# Patient Record
Sex: FEMALE | Race: WHITE | Employment: OTHER | URBAN - METROPOLITAN AREA
[De-identification: names, ages, dates, MRNs, and addresses within clinical notes are randomized per-mention and may not be internally consistent; named-entity substitution may affect disease eponyms.]

---

## 2017-03-20 ENCOUNTER — HOSPITAL ENCOUNTER (OUTPATIENT)
Dept: LAB | Age: 72
Discharge: HOME | End: 2017-03-20
Payer: MEDICARE

## 2017-03-20 ENCOUNTER — OFFICE VISIT (OUTPATIENT)
Dept: INTERNAL MEDICINE CLINIC | Age: 72
End: 2017-03-20

## 2017-03-20 VITALS
TEMPERATURE: 97.6 F | RESPIRATION RATE: 17 BRPM | DIASTOLIC BLOOD PRESSURE: 73 MMHG | HEART RATE: 85 BPM | HEIGHT: 62 IN | BODY MASS INDEX: 23.55 KG/M2 | SYSTOLIC BLOOD PRESSURE: 118 MMHG | OXYGEN SATURATION: 94 % | WEIGHT: 128 LBS

## 2017-03-20 DIAGNOSIS — Z11.59 NEED FOR HEPATITIS C SCREENING TEST: ICD-10-CM

## 2017-03-20 DIAGNOSIS — N81.4 UTERINE PROLAPS: ICD-10-CM

## 2017-03-20 DIAGNOSIS — R73.03 PREDIABETES: Primary | ICD-10-CM

## 2017-03-20 DIAGNOSIS — E55.9 VITAMIN D DEFICIENCY: ICD-10-CM

## 2017-03-20 DIAGNOSIS — E78.00 PURE HYPERCHOLESTEROLEMIA: ICD-10-CM

## 2017-03-20 DIAGNOSIS — C50.911 MALIGNANT NEOPLASM OF RIGHT FEMALE BREAST, UNSPECIFIED SITE OF BREAST: ICD-10-CM

## 2017-03-20 DIAGNOSIS — R73.01 IFG (IMPAIRED FASTING GLUCOSE): ICD-10-CM

## 2017-03-20 PROCEDURE — 85027 COMPLETE CBC AUTOMATED: CPT

## 2017-03-20 PROCEDURE — 80053 COMPREHEN METABOLIC PANEL: CPT

## 2017-03-20 PROCEDURE — 36415 COLL VENOUS BLD VENIPUNCTURE: CPT

## 2017-03-20 PROCEDURE — 82306 VITAMIN D 25 HYDROXY: CPT

## 2017-03-20 PROCEDURE — 83036 HEMOGLOBIN GLYCOSYLATED A1C: CPT

## 2017-03-20 PROCEDURE — 86803 HEPATITIS C AB TEST: CPT

## 2017-03-20 RX ORDER — ATORVASTATIN CALCIUM 20 MG/1
TABLET, FILM COATED ORAL
Qty: 90 TAB | Refills: 2 | Status: SHIPPED | OUTPATIENT
Start: 2017-03-20 | End: 2018-03-09 | Stop reason: SDUPTHER

## 2017-03-20 NOTE — MR AVS SNAPSHOT
Visit Information Date & Time Provider Department Dept. Phone Encounter #  
 3/20/2017  1:15 PM Rigo Hannon, [address] [phone] [id number] Follow-up Instructions Return in about 6 months (around 9/25/2017). Upcoming Health Maintenance Date Due Hepatitis C Screening 1945 Pneumococcal 65+ High/Highest Risk (2 of 2 - PCV13) 9/20/2017 MEDICARE YEARLY EXAM 9/21/2017 GLAUCOMA SCREENING Q2Y 12/9/2017 COLONOSCOPY 2/1/2018 BREAST CANCER SCRN MAMMOGRAM 4/12/2018 DTaP/Tdap/Td series (3 - Td) 2/3/2026 Allergies as of 3/20/2017  Review Complete On: 3/20/2017 By: Anais Burr LPN No Known Allergies Current Immunizations  Reviewed on 3/20/2017 Name Date Influenza Vaccine 11/11/2013 Influenza Vaccine (Quad) PF 9/20/2016 Pneumococcal Conjugate (PCV-13) 3/16/2017 Pneumococcal Polysaccharide (PPSV-23) 9/20/2016 Tdap 2/3/2016, 11/10/2008 Zoster Vaccine, Live 10/26/2012 Reviewed by Rigo Hannon MD on 3/20/2017 at  1:22 PM  
You Were Diagnosed With   
  
 Codes Comments Prediabetes    -  Primary ICD-10-CM: R73.03 
ICD-9-CM: 790.29 Pure hypercholesterolemia     ICD-10-CM: E78.00 ICD-9-CM: 272.0 Malignant neoplasm of right female breast, unspecified site [facility]     ICD-10-CM: C50.911 ICD-9-CM: 174.9 Uterine prolaps     ICD-10-CM: N81.4 ICD-9-CM: 618.1 Need for hepatitis C screening test     ICD-10-CM: Z11.59 
ICD-9-CM: V73.89 Vitamin D deficiency     ICD-10-CM: E55.9 ICD-9-CM: 268.9 IFG (impaired fasting glucose)     ICD-10-CM: R73.01 
ICD-9-CM: 790.21 Vitals BP Pulse Temp Resp Height(growth percentile) Weight(growth percentile) 118/73 (BP 1 Location: Left arm, BP Patient Position: Sitting) 85 97.6 °F (36.4 °C) (Oral) 17 5' 2\" (1.575 m) 128 lb (58.1 kg) SpO2 BMI OB Status Smoking Status 94% 23.41 kg/m2 Postmenopausal Former Smoker Vitals History BMI and BSA Data Body Mass Index Body Surface Area  
 23.41 kg/m 2 1.59 m 2 Preferred Pharmacy Pharmacy Name Phone [address], [facility] [phone] Your Updated Medication List  
  
   
This list is accurate as of: 3/20/17  1:22 PM.  Always use your most recent med list.  
  
  
  
  
 Acai Berry Extract 500 mg Cap Take 2 Caps by mouth daily. acetaminophen 325 mg tablet Commonly known as:  TYLENOL Take 325-650 mg by mouth every four (4) hours as needed for Pain. atorvastatin 20 mg tablet Commonly known as:  LIPITOR  
TAKE 1 TABLET NIGHTLY CALCIUM PO Take  by mouth. KRILL -87-33-50 mg Cap Generic drug:  krill-omega-3-dha-epa-lipids Take 1 Cap by mouth daily. loratadine 10 mg tablet Commonly known as:  Soliman Tatiana Take 10 mg by mouth.  
  
 multivitamin tablet Commonly known as:  ONE A DAY Take 1 Tab by mouth daily. PROBIOTIC 4X 10-15 mg Tbec Generic drug:  B.infantis-B.ani-B.long-B.bifi Take  by mouth daily. We Performed the Following CBC W/O DIFF [36189 CPT(R)] HEMOGLOBIN A1C WITH EAG [01029 CPT(R)] HEPATITIS C AB [64026 CPT(R)] METABOLIC PANEL, COMPREHENSIVE [18211 CPT(R)] VITAMIN D, 25 HYDROXY [id number] CPT(R)] Follow-up Instructions Return in about 6 months (around 9/25/2017). To-Do List   
 04/14/2017 12:40 PM  
  Appointment with [address] VALDOVINOS [address] at [address] [place] [address] ([phone]) Shower or bathe using soap and water. Do not use deodorant, powder, perfumes, or lotion the day of your exam.  If your prior mammograms were not performed at [facility] 6 please bring films with you or forward prior images 2 days before your procedure.   Check in at registration 15min before your appointment time unless you were instructed to do otherwise. A script is not necessary, but if you have one, please bring it on the day of the mammogram or have it faxed to the department. [facility] [phone] [facility]  [phone] [facility] [address] RAMU  [phone] [phone] [place] [address] [facility] [phone] Introducing [facility]! Emmie Warner introduces [employer] patient portal. Now you can access parts of your medical record, email your doctor's office, and request medication refills online. 1. In your internet browser, go to [email or web address] 2. Click on the First Time User? Click Here link in the Sign In box. You will see the New Member Sign Up page. 3. Enter your [employer] Access Code exactly as it appears below. You will not need to use this code after youve completed the sign-up process. If you do not sign up before the expiration date, you must request a new code. · [employer] Access Code: [email or web address] Expires: 6/5/2017 11:59 AM 
 
4. Enter the last four digits of your Social Security Number (xxxx) and Date of Birth (mm/dd/yyyy) as indicated and click Submit. You will be taken to the next sign-up page. 5. Create a [employer] ID. This will be your [employer] login ID and cannot be changed, so think of one that is secure and easy to remember. 6. Create a [employer] password. You can change your password at any time. 7. Enter your Password Reset Question and Answer. This can be used at a later time if you forget your password. 8. Enter your e-mail address. You will receive e-mail notification when new information is available in [address] 9. Click Sign Up. You can now view and download portions of your medical record. 10. Click the Download Summary menu link to download a portable copy of your medical information. If you have questions, please visit the Frequently Asked Questions section of the [employer] website. Remember, [employer] is NOT to be used for urgent needs. For medical emergencies, dial 911. Now available from your iPhone and Android! Please provide this summary of care documentation to your next provider. Your primary care clinician is listed as Chiquita Paulino. If you have any questions after today's visit, please call [phone].

## 2017-03-20 NOTE — PROGRESS NOTES
HISTORY OF PRESENT ILLNESS  Makenna Ramos is a 81 y.o. female. HPI   Last here 9/20/16. 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 Soham Burnette (uro-gyn) for uterine prolapse  Last saw her around 10/16 and has f/u pending in 4/17  She is considering getting mesh placed for her sx   She wonders my opinion about procedure-addressed with her         PREVENTIVE:  Colonoscopy: 2/04/13 Dr. Isabell Sheridan, likely 10 year repeat, does not specify on report   Pap: Dr. Gus Cuellar, 9/16   Mammogram: 4/12/16 negative, due 4/17, ordered   Dexa: due, declines   Tdap: declines  Pneumovax: 9/20/2016  [id number]: 3/16/2017  Zostavax: 10/26/2012  Flu shot: 9/20/2016  A1c: 7/15 5.9, 2/16 5.8, 9/16 5.6  Eye exam: Dr. Janice Mcgrath, 12/16  Hep C screen: ordered   Lipids: 9/16            Patient Active Problem List    Diagnosis Date Noted    Pure hyperglyceridemia 02/03/2016    Prediabetes 02/03/2016    [facility] ([facility] 07/16/2015    Personal history of colonic polyps 02/04/2013     Current Outpatient Prescriptions   Medication Sig Dispense Refill    atorvastatin (LIPITOR) 20 mg tablet TAKE 1 TABLET NIGHTLY 90 Tab 2    loratadine (CLARITIN) 10 mg tablet Take 10 mg by mouth.  CALCIUM PO Take  by mouth.       acetaminophen (TYLENOL) 325 mg tablet Take 325-650 mg by mouth every four (4) hours as needed for Pain.      krill-omega-3-dha-epa-lipids (KRILL OIL) 455-72-57-50 mg cap Take 1 Cap by mouth daily.  Acai Berry Extract 500 mg cap Take 2 Caps by mouth daily.  multivitamin (ONE A DAY) tablet Take 1 Tab by mouth daily. Past Surgical History:   Procedure Laterality Date    BREAST SURGERY PROCEDURE UNLISTED  2009    right lumpectomy      No results found for: WBC, WBCLT, HGBPOC, HGB, HGBP, HGBEXT, HCTPOC, HCT, HCTEXT, PHCT, RBCH, PLT, PLTEXT, MCV, HGBEXT, HCTEXT, PLTEXT    Lab Results  Component Value Date/Time   Cholesterol, total 207 09/07/2016 08:58 AM   HDL Cholesterol 64 09/07/2016 08:58 AM   LDL, calculated 111 09/07/2016 08:58 AM   Triglyceride 158 09/07/2016 08:58 AM       Lab Results  Component Value Date/Time   GFR est  09/07/2016 08:58 AM   GFR est non-AA 92 09/07/2016 08:58 AM   Creatinine 0.63 09/07/2016 08:58 AM   BUN 17 09/07/2016 08:58 AM   Sodium 143 09/07/2016 08:58 AM   Potassium 4.3 09/07/2016 08:58 AM   Chloride 103 09/07/2016 08:58 AM   CO2 22 09/07/2016 08:58 AM         Review of Systems   Respiratory: Negative for shortness of breath. Cardiovascular: Negative for chest pain. Physical Exam   Constitutional: She is oriented to person, place, and time. She appears well-developed and well-nourished. No distress. HENT:   Head: Normocephalic and atraumatic. Mouth/Throat: Oropharynx is clear and moist. No oropharyngeal exudate. Eyes: Conjunctivae and EOM are normal. Right eye exhibits no discharge. Left eye exhibits no discharge. Neck: Normal range of motion. Neck supple. Cardiovascular: Normal rate, regular rhythm and normal heart sounds. Exam reveals no gallop and no friction rub. No murmur heard. Pulmonary/Chest: Effort normal and breath sounds normal. No respiratory distress. She has no wheezes. She has no rales. She exhibits no tenderness. Musculoskeletal: She exhibits no edema, tenderness or deformity.    Lymphadenopathy:     She has no cervical adenopathy. Neurological: She is alert and oriented to person, place, and time. Coordination normal.   Skin: Skin is warm and dry. No rash noted. She is not diaphoretic. No erythema. No pallor. Psychiatric: She has a normal mood and affect. Her behavior is normal.       ASSESSMENT and PLAN    ICD-10-CM ICD-9-CM    1. Prediabetes    Repeat a1c today, diet controlled. R73.03 790.29 HEMOGLOBIN A1C WITH EAG      METABOLIC PANEL, COMPREHENSIVE      HEPATITIS C AB      CBC W/O DIFF      VITAMIN D, 25 HYDROXY   2. Pure hypercholesterolemia    On lipitor 20mg daily, last LDL a bit above goal, she is not interested in increasing dose further but would like to focus on diet, will repeat lipids prior to f/u in the fall  E78.00 272.0 HEMOGLOBIN A1C WITH EAG      METABOLIC PANEL, COMPREHENSIVE      HEPATITIS C AB      CBC W/O DIFF      VITAMIN D, 25 HYDROXY   3. Malignant neoplasm of right female breast, unspecified site of breast ([facility]    UTD with Dr. Danna Sky, will get her notes for review. Mammogram is due in March C50.911 174.9 HEMOGLOBIN A1C WITH EAG      METABOLIC PANEL, COMPREHENSIVE      HEPATITIS C AB      CBC W/O DIFF      VITAMIN D, 25 HYDROXY   4. Uterine prolaps    Has appt with Dr Felicia Ovalles for next month to discuss mesh surgery N81.4 618.1 HEMOGLOBIN A1C WITH EAG      METABOLIC PANEL, COMPREHENSIVE      HEPATITIS C AB      CBC W/O DIFF      VITAMIN D, 25 HYDROXY   5. Need for hepatitis C screening test    Ordered  Z11.59 V73.89 HEMOGLOBIN A1C WITH EAG      METABOLIC PANEL, COMPREHENSIVE      HEPATITIS C AB      CBC W/O DIFF      VITAMIN D, 25 HYDROXY   6. Vitamin D deficiency    Check level, continues OTC vit D E55.9 268.9 HEMOGLOBIN A1C WITH EAG      METABOLIC PANEL, COMPREHENSIVE      HEPATITIS C AB      CBC W/O DIFF      VITAMIN D, 25 HYDROXY   7.  IFG (impaired fasting glucose)    See above, check a1c  R73.01 790.21 HEMOGLOBIN A1C WITH EAG      METABOLIC PANEL, COMPREHENSIVE      HEPATITIS C AB      CBC W/O DIFF      VITAMIN D, 25 HYDROXY          Written by Graciela Nunes, as dictated by Yuni Sumner MD.    Current diagnosis and concerns discussed with pt at length. Understands risks and benefits or current treatment plan and medications and accepts the treatment and medication with any possible risks.   Pt asks appropriate questions which were answered.   Pt instructed to call with any concerns or problems. This note will not be viewable in [address]

## 2017-03-21 LAB
25(OH)D3+25(OH)D2 SERPL-MCNC: 40 NG/ML (ref 30–100)
ALBUMIN SERPL-MCNC: 4.3 G/DL (ref 3.5–4.8)
ALBUMIN/GLOB SERPL: 1.7 {RATIO} (ref 1.2–2.2)
ALP SERPL-CCNC: 75 IU/L (ref 39–117)
ALT SERPL-CCNC: 19 IU/L (ref 0–32)
AST SERPL-CCNC: 23 IU/L (ref 0–40)
BILIRUB SERPL-MCNC: 0.4 MG/DL (ref 0–1.2)
BUN SERPL-MCNC: 19 MG/DL (ref 8–27)
BUN/CREAT SERPL: 30 (ref 11–26)
CALCIUM SERPL-MCNC: 9.5 MG/DL (ref 8.7–10.3)
CHLORIDE SERPL-SCNC: 100 MMOL/L (ref 96–106)
CO2 SERPL-SCNC: 23 MMOL/L (ref 18–29)
CREAT SERPL-MCNC: 0.64 MG/DL (ref 0.57–1)
ERYTHROCYTE [DISTWIDTH] IN BLOOD BY AUTOMATED COUNT: 13.7 % (ref 12.3–15.4)
EST. AVERAGE GLUCOSE BLD GHB EST-MCNC: 114 MG/DL
GLOBULIN SER CALC-MCNC: 2.6 G/DL (ref 1.5–4.5)
GLUCOSE SERPL-MCNC: 81 MG/DL (ref 65–99)
HBA1C MFR BLD: 5.6 % (ref 4.8–5.6)
HCT VFR BLD AUTO: 39.5 % (ref 34–46.6)
HCV AB S/CO SERPL IA: <0.1 S/CO RATIO (ref 0–0.9)
HGB BLD-MCNC: 13.2 G/DL (ref 11.1–15.9)
MCH RBC QN AUTO: 30.6 PG (ref 26.6–33)
MCHC RBC AUTO-ENTMCNC: 33.4 G/DL (ref 31.5–35.7)
MCV RBC AUTO: 92 FL (ref 79–97)
PLATELET # BLD AUTO: 159 X10E3/UL (ref 150–379)
POTASSIUM SERPL-SCNC: 4 MMOL/L (ref 3.5–5.2)
PROT SERPL-MCNC: 6.9 G/DL (ref 6–8.5)
RBC # BLD AUTO: 4.31 X10E6/UL (ref 3.77–5.28)
SODIUM SERPL-SCNC: 141 MMOL/L (ref 134–144)
WBC # BLD AUTO: 5.4 X10E3/UL (ref 3.4–10.8)

## 2017-04-10 ENCOUNTER — TELEPHONE (OUTPATIENT)
Dept: INTERNAL MEDICINE CLINIC | Age: 72
End: 2017-04-10

## 2017-04-10 NOTE — TELEPHONE ENCOUNTER
Patient states she needs a call back to get the status of requests for her lab results to be mailed as patient states she has not received yet. Patient states a detailed message can be left on voice mail of attached phone number. Please call to update.  Thank you

## 2017-04-14 ENCOUNTER — HOSPITAL ENCOUNTER (OUTPATIENT)
Dept: MAMMOGRAPHY | Age: 72
Discharge: HOME | End: 2017-04-14
Attending: INTERNAL MEDICINE
Payer: MEDICARE

## 2017-04-14 DIAGNOSIS — Z12.31 VISIT FOR SCREENING MAMMOGRAM: ICD-10-CM

## 2017-04-14 PROCEDURE — 77067 SCR MAMMO BI INCL CAD: CPT

## 2017-09-19 ENCOUNTER — DOCUMENTATION ONLY (OUTPATIENT)
Dept: INTERNAL MEDICINE CLINIC | Age: 72
End: 2017-09-19

## 2017-09-19 NOTE — PROGRESS NOTES
Medicare Part B Preventive Services Limitations Recommendation Scheduled   Bone Mass Measurement  (age 72 & older, biennial) Requires diagnosis related to osteoporosis or estrogen deficiency. Biennial benefit unless patient has history of long-term glucocorticoid tx or baseline is needed because initial test was by other method Sometime in 2014    Recommended every 2 years Due now    Cardiovascular Screening Blood Tests (every 5 years)  Total cholesterol, HDL, Triglycerides Order as a panel if possible Completed 9/8/16    Recommended every year Due 9/2017   Colorectal Cancer Screening  -Fecal occult blood test (annual)  -Flexible sigmoidoscopy (5y)  -Screening colonoscopy (10y)  -Barium Enema    Completed in 2/2013 with Dr. Anusha Chappell    Repeat in 3 to 5 years  Due 2/2016-2/2018   Counseling to Prevent Tobacco Use (up to 8 sessions per year)  - Counseling greater than 3 and up to 10 minutes  - Counseling greater than 10 minutes Patients must be asymptomatic of tobacco-related conditions to receive as preventive service Quit in the 1970s N/A   Diabetes Screening Tests (at least every 3 years, Medicare covers annually or at 6-month intervals for prediabetic patients)      Fasting blood sugar (FBS) or glucose tolerance test (GTT) Patient must be diagnosed with one of the following:  -Hypertension, Dyslipidemia, obesity, previous impaired FBS or GTT  Or any two of the following: overweight, FH of diabetes, age ? 72, history of gestational diabetes, birth of baby weighing more than 9 pounds Completed 3/21/17 with A1C 5.6    Recommended every year for non-diabetics  Due 3/2018   Diabetes Self-Management Training (DSMT) (no USPSTF recommendation) Requires referral by treating physician for patient with diabetes or renal disease. 10 hours of initial DSMT session of no less than 30 minutes each in a continuous 12-month period. 2 hours of follow-up DSMT in subsequent years.  N/A N/A   Glaucoma Screening (no USPSTF recommendation) Diabetes mellitus, family history, , age 48 or over,  American, age 72 or over Completed 9/20/16 with Dr. Comfort Rabago    Recommended annually Due 9/2017   Human Immunodeficiency Virus (HIV) Screening (annually for increased risk patients)  HIV-1 and HIV-2 by EIA, KEVNI, rapid antibody test, or oral mucosa transudate Patient must be at increased risk for HIV infection per USPSTF guidelines or pregnant. Tests covered annually for patients at increased risk. Pregnant patients may receive up to 3 test during pregnancy. N/A N/A   Medical Nutrition Therapy (MNT) (for diabetes or renal disease not recommended schedule) Requires referral by treating physician for patient with diabetes or renal disease. Can be provided in same year as diabetes self-management training (DSMT), and CMS recommends medical nutrition therapy take place after DSMT. Up to 3 hours for initial year and 2 hours in subsequent years. N/A N/A   Prostate Cancer Screening (annually up to age 76)  - Digital rectal exam (ANG)  - Prostate specific antigen (PSA) Annually (age 48 or over), ANG not paid separately when covered E/M service is provided on same date N/A N/A   Seasonal Influenza Vaccination (annually)    Completed 9/20/16    Recommended every year Due Fall 2017   Pneumococcal Vaccination (once after 72)    [id number] - 9/20/16    [id number] - 3/16/17    Both recommended once over the age of 72 Completed      Completed   Hepatitis B Vaccinations (if medium/high risk) Medium/high risk factors: End-stage renal disease,  Hemophiliacs who received Factor VIII or IX concentrates, Clients of institutions for the mentally retarded, Persons who live in the same house as a HepB virus carrier, Homosexual men, Illicit injectable drug abusers. N/A N/A   Screening Mammography (biennial age 54-69)?  Annually (age 36 or over) Completed 4/14/17    Recommended every year Due 4/2018   Screening Pap Tests and Pelvic Examination (up to age 79 and after 70 if unknown history or abnormal study last 10 years) Every 24 months except high risk Completed 9/2016 with Dr. Pinedo Sensor     Recommended every 2 years Due per Dr. Sage Ly for Abdominal Aortic Aneurysm (AAA) (once) Patient must be referred through [facility] and not have had a screening for abdominal aortic aneurysm before under Medicare.  Limited to patients who meet one of the following criteria:  - Men who are 73-68 years old and have smoked more than 100 cigarettes in their lifetime.  -Anyone with a FH of AAA  -Anyone recommended for screening by USPSTF N/A N/A

## 2017-09-20 ENCOUNTER — OFFICE VISIT (OUTPATIENT)
Dept: INTERNAL MEDICINE CLINIC | Age: 72
End: 2017-09-20

## 2017-09-20 ENCOUNTER — HOSPITAL ENCOUNTER (OUTPATIENT)
Dept: LAB | Age: 72
Discharge: HOME | End: 2017-09-20
Payer: MEDICARE

## 2017-09-20 VITALS
RESPIRATION RATE: 16 BRPM | DIASTOLIC BLOOD PRESSURE: 78 MMHG | OXYGEN SATURATION: 94 % | WEIGHT: 128 LBS | HEIGHT: 62 IN | SYSTOLIC BLOOD PRESSURE: 126 MMHG | HEART RATE: 79 BPM | TEMPERATURE: 97.3 F | BODY MASS INDEX: 23.55 KG/M2

## 2017-09-20 DIAGNOSIS — C50.911 MALIGNANT NEOPLASM OF RIGHT FEMALE BREAST, UNSPECIFIED SITE OF BREAST: ICD-10-CM

## 2017-09-20 DIAGNOSIS — Z00.00 MEDICARE ANNUAL WELLNESS VISIT, SUBSEQUENT: ICD-10-CM

## 2017-09-20 DIAGNOSIS — E78.5 DYSLIPIDEMIA: ICD-10-CM

## 2017-09-20 DIAGNOSIS — R73.01 IFG (IMPAIRED FASTING GLUCOSE): ICD-10-CM

## 2017-09-20 DIAGNOSIS — N39.3 STRESS BLADDER INCONTINENCE, FEMALE: ICD-10-CM

## 2017-09-20 DIAGNOSIS — Z23 ENCOUNTER FOR IMMUNIZATION: ICD-10-CM

## 2017-09-20 DIAGNOSIS — Z13.31 DEPRESSION SCREEN: ICD-10-CM

## 2017-09-20 DIAGNOSIS — Z01.818 PREOP EXAM FOR INTERNAL MEDICINE: Primary | ICD-10-CM

## 2017-09-20 PROCEDURE — 84443 ASSAY THYROID STIM HORMONE: CPT

## 2017-09-20 PROCEDURE — 80053 COMPREHEN METABOLIC PANEL: CPT

## 2017-09-20 PROCEDURE — 80061 LIPID PANEL: CPT

## 2017-09-20 PROCEDURE — 36415 COLL VENOUS BLD VENIPUNCTURE: CPT

## 2017-09-20 PROCEDURE — 83036 HEMOGLOBIN GLYCOSYLATED A1C: CPT

## 2017-09-20 NOTE — PROGRESS NOTES
HISTORY OF PRESENT ILLNESS  Tara Rosenbaum is a 81 y.o. female. HPI   Last here 3/20/17. Pt is here to f/u on chronic conditions and receive pre-op care. Pt has a mesh placement and possible bilateral oophorectomy with Dr. 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  C Screen negative  Repeat labs today     Wt is stable since last visit   Her weight is within normal ranges  Discussed diet and weight loss     Continues lipitor 20mg daily for cholesterol, tolerating well  Pt is not interested in increasing her lipitor unless her cholesterol becomes significantly elevated     Pt follows with Dr. Vania Brantley (uro-gyn) for leaky bladder and uterine prolapse  Last visit was 4/17  Pt provided with paperwork for upcoming surgery - reviewed  Pt will have a bilateral oophorectomy and mesh placement pending for 10/19/17  Pt did not know that she would be having a bilateral oophorectomy - advised her to address this with her uro-gyn  Discussed having bilateral oophorectomy to avoid ovarian cancer  Of note, pt had a hysterectomy in the 1970s - not on file     Reviewed mammogram 4/14/17: negative    Began discussion today, SDM is her  Jaycob Mate).     PREVENTIVE:  Colonoscopy: 2/04/13, Dr. Debbie Reza, likely 10 year repeat, does not specify on report, advised to contact office  Pap: Dr. Vania Brantley, , hysterectomy in 1970s  Mammogram: 17, negative  Dexa: declines   Tdap: declines  Pneumovax: 16  [id number]: 3/16/17  Zostavax: 10/26/12  Flu shot: 17  Hep C Screen: 3/17, negative  A1C: 7/15 5.9,  5.8,  5.6, 3/17 5.6  Eye exam: Dr. Facundo Guerrero, , scheduled for   Lipids:  , ordered today  EK17, nsr    Patient Active Problem List    Diagnosis Date Noted    Pure hyperglyceridemia 2016    Prediabetes 2016    Breast cancer ([facility] [address] 2015    Personal history of colonic polyps 2013     Current Outpatient Prescriptions   Medication Sig Dispense Refill    atorvastatin (LIPITOR) 20 mg tablet TAKE 1 TABLET NIGHTLY 90 Tab 2    B.infantis-B.ani-B.long-B.bifi (PROBIOTIC 4X) 10-15 mg TbEC Take  by mouth daily.  loratadine (CLARITIN) 10 mg tablet Take 10 mg by mouth.  CALCIUM PO Take  by mouth.  acetaminophen (TYLENOL) 325 mg tablet Take 325-650 mg by mouth every four (4) hours as needed for Pain.  krill-omega-3-dha-epa-lipids (KRILL OIL) 299-27-22-00 mg cap Take 1 Cap by mouth daily.  Acai Berry Extract 500 mg cap Take 2 Caps by mouth daily.  multivitamin (ONE A DAY) tablet Take 1 Tab by mouth daily.          Past Surgical History:   Procedure Laterality Date    BREAST SURGERY PROCEDURE UNLISTED      right lumpectomy    HX BREAST LUMPECTOMY Right     no radiation or chemo      Lab Results  Component Value Date/Time   WBC 5.4 2017 01:41 PM   HGB 13.2 2017 01:41 PM   HCT 39.5 2017 01:41 PM   PLATELET 170  01:41 PM   MCV 92 2017 01:41 PM     Lab Results  Component Value Date/Time   Cholesterol, total 207 2016 08:58 AM   HDL Cholesterol 64 2016 08:58 AM   LDL, calculated 111 2016 08:58 AM   Triglyceride 158 2016 08:58 AM     Lab Results  Component Value Date/Time   GFR est non-AA 91 2017 01:41 PM   GFR est  03/20/2017 01:41 PM   Creatinine 0.64 03/20/2017 01:41 PM   BUN 19 03/20/2017 01:41 PM   Sodium 141 03/20/2017 01:41 PM   Potassium 4.0 03/20/2017 01:41 PM   Chloride 100 03/20/2017 01:41 PM   CO2 23 03/20/2017 01:41 PM          Review of Systems   HENT: Negative for hearing loss. Respiratory: Negative for shortness of breath and wheezing. Cardiovascular: Negative for chest pain, palpitations, orthopnea, claudication, leg swelling and PND. Musculoskeletal: Negative for falls. Psychiatric/Behavioral: Negative for depression and memory loss. Physical Exam   Constitutional: She is oriented to person, place, and time. She appears well-developed and well-nourished. No distress. HENT:   Head: Normocephalic and atraumatic. Right Ear: External ear normal.   Left Ear: External ear normal.   Mouth/Throat: Oropharynx is clear and moist. No oropharyngeal exudate. Eyes: Conjunctivae and EOM are normal. Right eye exhibits no discharge. Left eye exhibits no discharge. Neck: Normal range of motion. Neck supple. No thyromegaly present. No carotid bruits     Cardiovascular: Normal rate, regular rhythm, normal heart sounds and intact distal pulses. Exam reveals no gallop and no friction rub. No murmur heard. Pulmonary/Chest: Effort normal and breath sounds normal. No respiratory distress. She has no wheezes. She has no rales. She exhibits no tenderness. Abdominal: Soft. She exhibits no distension and no mass. There is no tenderness. There is no rebound and no guarding. Musculoskeletal: Normal range of motion. She exhibits no edema, tenderness or deformity. Lymphadenopathy:     She has no cervical adenopathy. Neurological: She is alert and oriented to person, place, and time. She has normal reflexes. She exhibits normal muscle tone. Coordination normal.   Skin: Skin is warm and dry. No rash noted. She is not diaphoretic. No erythema. No pallor.    Psychiatric: She has a normal mood and affect. Her behavior is normal.       ASSESSMENT and PLAN    ICD-10-CM ICD-9-CM    1. Preop exam for internal medicine    Pt is low risk for intermediate risk surgery with good functional mets, EKG is nsr, pt may proceed to surgery without additional cardiac w/u Z01.818 V72.83 LIPID PANEL      METABOLIC PANEL, COMPREHENSIVE      TSH 3RD GENERATION      HEMOGLOBIN A1C WITH EAG      AMB POC EKG ROUTINE W/ 12 LEADS, INTER & REP   2. Medicare annual wellness visit, subsequent Z00.00 V70.0 LIPID PANEL      METABOLIC PANEL, COMPREHENSIVE      TSH 3RD GENERATION      HEMOGLOBIN A1C WITH EAG   3. IFG (impaired fasting glucose)    a1c nl on recent blood work, will repeat today to ensure stable R73.01 790.21 LIPID PANEL      METABOLIC PANEL, COMPREHENSIVE      TSH 3RD GENERATION      HEMOGLOBIN A1C WITH EAG   4. Dyslipidemia    On lipitor, repeat lipids today, adjust meds further as needed E78.5 272.4 LIPID PANEL      METABOLIC PANEL, COMPREHENSIVE      TSH 3RD GENERATION      HEMOGLOBIN A1C WITH EAG   5. Stress bladder incontinence, female    Has surgery planned for next month, pt is cleared for surgery N39.3 625.6 LIPID PANEL      METABOLIC PANEL, COMPREHENSIVE      TSH 3RD GENERATION      HEMOGLOBIN A1C WITH EAG   6. Malignant neoplasm of right female breast, unspecified site of breast ([facility] [address]    Previously on femara, UTD with Dr. Marleni Cedeno, [facility] with mammogram, in remission C50.911 174.9 LIPID PANEL      METABOLIC PANEL, COMPREHENSIVE      TSH 3RD GENERATION      HEMOGLOBIN A1C WITH EAG   Depression screen reviewed and negative. Scribed by Claudia Worrell of [address] [place] [address] [address], as dictated by Dr. Tasha Loco. Current diagnosis and concerns discussed with pt at length. Pt understands risks and benefits or current treatment plan and medications, and accepts the treatment and medication with any possible risks. Pt asks appropriate questions, which were answered. Pt was instructed to call with any concerns or problems. This note will not be viewable in [address]

## 2017-09-20 NOTE — MR AVS SNAPSHOT
Visit Information Date & Time Provider Department Dept. Phone Encounter #  
 9/20/2017  8:30 AM Joan Mcgowan, [address] [place] [address],[address] [phone] [id number] Follow-up Instructions Return in about 1 year (around 9/20/2018). Upcoming Health Maintenance Date Due INFLUENZA AGE 9 TO ADULT 8/1/2017 COLONOSCOPY 2/1/2018 MEDICARE YEARLY EXAM 9/21/2017 GLAUCOMA SCREENING Q2Y 12/9/2017 BREAST CANCER SCRN MAMMOGRAM 4/14/2019 DTaP/Tdap/Td series (3 - Td) 2/3/2026 Allergies as of 9/20/2017  Review Complete On: 9/20/2017 By: Joan Mcgowan MD  
 No Known Allergies Current Immunizations  Reviewed on 3/20/2017 Name Date Influenza Vaccine 11/11/2013 Influenza Vaccine (Quad) PF 9/20/2016 Pneumococcal Conjugate (PCV-13) 3/16/2017 Pneumococcal Polysaccharide (PPSV-23) 9/20/2016 Tdap 2/3/2016, 11/10/2008 Zoster Vaccine, Live 10/26/2012 Not reviewed this visit You Were Diagnosed With   
  
 Codes Comments Preop exam for internal medicine    -  Primary ICD-10-CM: A43.588 ICD-9-CM: V72.83 Medicare annual wellness visit, subsequent     ICD-10-CM: Z00.00 ICD-9-CM: V70.0 IFG (impaired fasting glucose)     ICD-10-CM: R73.01 
ICD-9-CM: 790.21 Dyslipidemia     ICD-10-CM: E78.5 ICD-9-CM: 272.4 Stress bladder incontinence, female     ICD-10-CM: N39.3 ICD-9-CM: 625.6 Malignant neoplasm of right female breast, unspecified site of breast ([facility] [address]     ICD-10-CM: C50.911 ICD-9-CM: 174.9 Vitals BP Pulse Temp Resp Height(growth percentile) Weight(growth percentile) 126/78 (BP 1 Location: Left arm, BP Patient Position: Sitting) 79 97.3 °F (36.3 °C) (Oral) 16 5' 2\" (1.575 m) 128 lb (58.1 kg) SpO2 BMI OB Status Smoking Status 94% 23.41 kg/m2 Postmenopausal Former Smoker BMI and BSA Data Body Mass Index Body Surface Area  
 23.41 kg/m 2 1.59 m 2 [phone] Your Updated Medication List  
  
   
This list is accurate as of: 9/20/17  8:40 AM.  Always use your most recent med list.  
  
  
  
  
 Acai Berry Extract 500 mg Cap Take 2 Caps by mouth daily. acetaminophen 325 mg tablet Commonly known as:  TYLENOL Take 325-650 mg by mouth every four (4) hours as needed for Pain. atorvastatin 20 mg tablet Commonly known as:  LIPITOR  
TAKE 1 TABLET NIGHTLY CALCIUM PO Take  by mouth. KRILL -00-86-50 mg Cap Generic drug:  krill-omega-3-dha-epa-lipids Take 1 Cap by mouth daily. loratadine 10 mg tablet Commonly known as:  Jen Galea Take 10 mg by mouth.  
  
 multivitamin tablet Commonly known as:  ONE A DAY Take 1 Tab by mouth daily. PROBIOTIC 4X 10-15 mg Tbec Generic drug:  B.infantis-B.ani-B.long-B.bifi Take  by mouth daily. We Performed the Following AMB POC EKG ROUTINE W/ 12 LEADS, INTER & REP [13285 CPT(R)] HEMOGLOBIN A1C WITH EAG [00067 CPT(R)] LIPID PANEL [27140 CPT(R)] METABOLIC PANEL, COMPREHENSIVE [20185 CPT(R)] TSH 3RD GENERATION [59127 CPT(R)] Follow-up Instructions Return in about 1 year (around 9/20/2018). Patient Instructions Medicare Part B Preventive Services Limitations Recommendation Scheduled Bone Mass Measurement 
(age 72 & older, biennial) Requires diagnosis related to osteoporosis or estrogen deficiency. Biennial benefit unless patient has history of long-term glucocorticoid tx or baseline is needed because initial test was by other method Sometime in 2014 
  
Recommended every 2 years Due now--declines Cardiovascular Screening Blood Tests (every 5 years) Total cholesterol, HDL, Triglycerides Order as a panel if possible Completed 9/8/16 
  
Recommended every year Due 9/2017 Colorectal Cancer Screening 
-Fecal occult blood test (annual) -Flexible sigmoidoscopy (5y) 
-Screening colonoscopy (10y) -Barium Enema    Completed in 2/2013 with Dr. Jody Soriano 
  
Repeat in 5-10 years  Due after 2018 Call dr Aki Blair office to find out when to repeat this Counseling to Prevent Tobacco Use (up to 8 sessions per year) - Counseling greater than 3 and up to 10 minutes - Counseling greater than 10 minutes Patients must be asymptomatic of tobacco-related conditions to receive as preventive service Quit in the 1970s N/A Diabetes Screening Tests (at least every 3 years, Medicare covers annually or at 6-month intervals for prediabetic patients) 
   
Fasting blood sugar (FBS) or glucose tolerance test (GTT) Patient must be diagnosed with one of the following: 
-Hypertension, Dyslipidemia, obesity, previous impaired FBS or GTT 
Or any two of the following: overweight, FH of diabetes, age ? 72, history of gestational diabetes, birth of baby weighing more than 9 pounds Completed 3/21/17 with A1C 5.6 
  
Recommended every year for non-diabetics  Due 3/2018 Diabetes Self-Management Training (DSMT) (no USPSTF recommendation) Requires referral by treating physician for patient with diabetes or renal disease. 10 hours of initial DSMT session of no less than 30 minutes each in a continuous 12-month period. 2 hours of follow-up DSMT in subsequent years. N/A N/A Glaucoma Screening (no USPSTF recommendation) Diabetes mellitus, family history, , age 48 or over,  American, age 72 or over Completed 12/16 with Dr. Renny Ceron 
  
Recommended annually Due 12/17 Human Immunodeficiency Virus (HIV) Screening (annually for increased risk patients) HIV-1 and HIV-2 by EIA, KEVIN, rapid antibody test, or oral mucosa transudate Patient must be at increased risk for HIV infection per USPSTF guidelines or pregnant. Tests covered annually for patients at increased risk. Pregnant patients may receive up to 3 test during pregnancy.  N/A N/A  
 Medical Nutrition Therapy (MNT) (for diabetes or renal disease not recommended schedule) Requires referral by treating physician for patient with diabetes or renal disease. Can be provided in same year as diabetes self-management training (DSMT), and CMS recommends medical nutrition therapy take place after DSMT. Up to 3 hours for initial year and 2 hours in subsequent years. N/A N/A Prostate Cancer Screening (annually up to age 76) - Digital rectal exam (ANG) - Prostate specific antigen (PSA) Annually (age 48 or over), ANG not paid separately when covered E/M service is provided on same date N/A N/A Seasonal Influenza Vaccination (annually)    Completed 9/17 
  
Recommended every year Due Fall 2018 Pneumococcal Vaccination (once after 65)    [id number] - 9/20/16 
  
[id number] - 3/16/17 
  
Both recommended once over the age of 72 Completed 
  
  
Completed Hepatitis B Vaccinations (if medium/high risk) Medium/high risk factors: End-stage renal disease, Hemophiliacs who received Factor VIII or IX concentrates, Clients of institutions for the mentally retarded, Persons who live in the same house as a HepB virus carrier, Homosexual men, Illicit injectable drug abusers. N/A N/A Screening Mammography (biennial age 54-69)? Annually (age 36 or over) Completed 4/14/17 
  
Recommended every year Due 4/2018 Screening Pap Tests and Pelvic Examination (up to age 79 and after 79 if unknown history or abnormal study last 10 years) Every 24 months except high risk Completed 9/2016 with Dr. Areli Reyes  
  
Recommended every 2 years Due per Dr. Aerli Reyes Ultrasound Screening for Abdominal Aortic Aneurysm (AAA) (once) Patient must be referred through [facility] and not have had a screening for abdominal aortic aneurysm before under Medicare. Limited to patients who meet one of the following criteria: 
- Men who are 73-68 years old and have smoked more than 100 cigarettes in their lifetime. 
-Anyone with a FH of AAA -Anyone recommended for screening by USPSTF N/A N/A  
  
  
Please bring in a copy of your advanced directive to your next office visit so we can have a copy on file. Introducing [facility]! Mary Jo Little introduces [employer] patient portal. Now you can access parts of your medical record, email your doctor's office, and request medication refills online. 1. In your internet browser, go to [email or web address] 2. Click on the First Time User? Click Here link in the Sign In box. You will see the New Member Sign Up page. 3. Enter your [employer] Access Code exactly as it appears below. You will not need to use this code after youve completed the sign-up process. If you do not sign up before the expiration date, you must request a new code. · [employer] Access Code: [email or web address] Expires: 12/19/2017  8:40 AM 
 
4. Enter the last four digits of your Social Security Number (xxxx) and Date of Birth (mm/dd/yyyy) as indicated and click Submit. You will be taken to the next sign-up page. 5. Create a [employer] ID. This will be your [employer] login ID and cannot be changed, so think of one that is secure and easy to remember. 6. Create a [employer] password. You can change your password at any time. 7. Enter your Password Reset Question and Answer. This can be used at a later time if you forget your password. 8. Enter your e-mail address. You will receive e-mail notification when new information is available in [address] 9. Click Sign Up. You can now view and download portions of your medical record. 10. Click the Download Summary menu link to download a portable copy of your medical information. If you have questions, please visit the Frequently Asked Questions section of the [employer] website. Remember, [employer] is NOT to be used for urgent needs. For medical emergencies, dial 911. Now available from your iPhone and Android! Please provide this summary of care documentation to your next provider. Your primary care clinician is listed as Maximino Medellin. If you have any questions after today's visit, please call [phone].

## 2017-09-20 NOTE — PATIENT INSTRUCTIONS
Medicare Part B Preventive Services Limitations Recommendation Scheduled   Bone Mass Measurement  (age 72 & older, biennial) Requires diagnosis related to osteoporosis or estrogen deficiency. Biennial benefit unless patient has history of long-term glucocorticoid tx or baseline is needed because initial test was by other method Sometime in 2014     Recommended every 2 years Due now--declines    Cardiovascular Screening Blood Tests (every 5 years)  Total cholesterol, HDL, Triglycerides Order as a panel if possible Completed 9/8/16     Recommended every year Due 9/2017   Colorectal Cancer Screening  -Fecal occult blood test (annual)  -Flexible sigmoidoscopy (5y)  -Screening colonoscopy (10y)  -Barium Enema    Completed in 2/2013 with Dr. Angela Royal     Repeat in 5-10 years  Due after 2018    Call dr Stella Fatima office to find out when to repeat this   Counseling to Prevent Tobacco Use (up to 8 sessions per year)  - Counseling greater than 3 and up to 10 minutes  - Counseling greater than 10 minutes Patients must be asymptomatic of tobacco-related conditions to receive as preventive service Quit in the 1970s N/A   Diabetes Screening Tests (at least every 3 years, Medicare covers annually or at 6-month intervals for prediabetic patients)      Fasting blood sugar (FBS) or glucose tolerance test (GTT) Patient must be diagnosed with one of the following:  -Hypertension, Dyslipidemia, obesity, previous impaired FBS or GTT  Or any two of the following: overweight, FH of diabetes, age ? 72, history of gestational diabetes, birth of baby weighing more than 9 pounds Completed 3/21/17 with A1C 5.6     Recommended every year for non-diabetics  Due 3/2018   Diabetes Self-Management Training (DSMT) (no USPSTF recommendation) Requires referral by treating physician for patient with diabetes or renal disease. 10 hours of initial DSMT session of no less than 30 minutes each in a continuous 12-month period.  2 hours of follow-up DSMT in subsequent years. N/A N/A   Glaucoma Screening (no USPSTF recommendation) Diabetes mellitus, family history, , age 48 or over,  American, age 72 or over Completed 12/16 with Dr. Nannette Dangelo     Recommended annually Due 12/17   Human Immunodeficiency Virus (HIV) Screening (annually for increased risk patients)  HIV-1 and HIV-2 by EIA, KEVIN, rapid antibody test, or oral mucosa transudate Patient must be at increased risk for HIV infection per USPSTF guidelines or pregnant. Tests covered annually for patients at increased risk. Pregnant patients may receive up to 3 test during pregnancy. N/A N/A   Medical Nutrition Therapy (MNT) (for diabetes or renal disease not recommended schedule) Requires referral by treating physician for patient with diabetes or renal disease. Can be provided in same year as diabetes self-management training (DSMT), and CMS recommends medical nutrition therapy take place after DSMT. Up to 3 hours for initial year and 2 hours in subsequent years. N/A N/A   Prostate Cancer Screening (annually up to age 76)  - Digital rectal exam (ANG)  - Prostate specific antigen (PSA) Annually (age 48 or over), ANG not paid separately when covered E/M service is provided on same date N/A N/A   Seasonal Influenza Vaccination (annually)    Completed 9/17     Recommended every year Due Fall 2018   Pneumococcal Vaccination (once after 72)    [id number] - 9/20/16     Liliana Omar - 3/16/17     Both recommended once over the age of 72 Completed        Completed   Hepatitis B Vaccinations (if medium/high risk) Medium/high risk factors: End-stage renal disease,  Hemophiliacs who received Factor VIII or IX concentrates, Clients of institutions for the mentally retarded, Persons who live in the same house as a HepB virus carrier, Homosexual men, Illicit injectable drug abusers. N/A N/A   Screening Mammography (biennial age 54-69)?  Annually (age 36 or over) Completed 4/14/17     Recommended every year Due 4/2018   Screening Pap Tests and Pelvic Examination (up to age 79 and after 79 if unknown history or abnormal study last 10 years) Every 24 months except high risk Completed 9/2016 with Dr. Mir Maier      Recommended every 2 years Due per Dr. Patel Kirk for Abdominal Aortic Aneurysm (AAA) (once) Patient must be referred through [facility] and not have had a screening for abdominal aortic aneurysm before under Medicare. Limited to patients who meet one of the following criteria:  - Men who are 73-68 years old and have smoked more than 100 cigarettes in their lifetime.  -Anyone with a FH of AAA  -Anyone recommended for screening by USPSTF N/A N/A         Please bring in a copy of your advanced directive to your next office visit so we can have a copy on file.

## 2017-09-20 NOTE — PROGRESS NOTES
This is a Subsequent Medicare Annual Wellness Exam (AWV) (Performed 12 months after IPPE or effective date of Medicare Part B enrollment, Once in a lifetime)    I have reviewed the patient's medical history in detail and updated the computerized patient record. History     Past Medical History:   Diagnosis Date    Breast [facility] ([facility]     right lumpectomy    Cancer ([facility]     right breast cancer    Hypercholesterolemia     Other ill-defined conditions     high cholesterol    Personal history of colonic polyps 2013      Past Surgical History:   Procedure Laterality Date    BREAST SURGERY PROCEDURE UNLISTED      right lumpectomy    HX BREAST LUMPECTOMY Right 192    no radiation or chemo     Current Outpatient Prescriptions   Medication Sig Dispense Refill    atorvastatin (LIPITOR) 20 mg tablet TAKE 1 TABLET NIGHTLY 90 Tab 2    B.infantis-B.ani-B.long-B.bifi (PROBIOTIC 4X) 10-15 mg TbEC Take  by mouth daily.  loratadine (CLARITIN) 10 mg tablet Take 10 mg by mouth.  CALCIUM PO Take  by mouth.  acetaminophen (TYLENOL) 325 mg tablet Take 325-650 mg by mouth every four (4) hours as needed for Pain.  krill-omega-3-dha-epa-lipids (KRILL OIL) 384-94-56-87 mg cap Take 1 Cap by mouth daily.  Acai Berry Extract 500 mg cap Take 2 Caps by mouth daily.  multivitamin (ONE A DAY) tablet Take 1 Tab by mouth daily.          No Known Allergies  Family History   Problem Relation Age of Onset    Heart Disease Mother       from [facility] Diabetes Father     Diabetes Sister     Heart Disease Sister      heart failure    Liver Disease Sister     Heart Disease Son      Stent placed     Social History   Substance Use Topics    Smoking status: Former Smoker    Smokeless tobacco: Never Used    Alcohol use Yes      Comment: seldom     Patient Active Problem List   Diagnosis Code    Personal history of colonic polyps Z86.010    Breast cancer ([facility] C50.919    Pure hyperglyceridemia E78.1    Prediabetes R73.03       Depression Risk Factor Screening:     PHQ over the last two weeks 3/20/2017   Little interest or pleasure in doing things Not at all   Feeling down, depressed or hopeless Not at all   Total Score PHQ 2 0   no depression   Alcohol Risk Factor Screening: You do not drink alcohol or very rarely. Very rare    Functional Ability and Level of Safety:   Hearing Loss  Hearing is good. No issue  Activities of Daily Living  The home contains: no safety equipment  Patient does total self care    Fall RiskFall Risk Assessment, last 12 mths 3/20/2017   Able to walk? Yes   Fall in past 12 months? No     No falls  Abuse Screen  Patient is not abused  Lives with  and son lives there as well, safe, happy   Cognitive Screening   Evaluation of Cognitive Function:  Has your family/caregiver stated any concerns about your memory: no  Normal    Patient Care Team   Patient Care Team:  Vinita Pierre MD as PCP - General (Internal Medicine)  Ketty Wylie MD (Hematology and Oncology)  Concepción Charlton DPM (Podiatry)  Sophia Ronquillo OD (Ophthalmology)  CHARITO Beckwith MD (Gastroenterology)  Jamari Samson MD (Gynecology)  updated  Assessment/Plan   Education and counseling provided:  Are appropriate based on today's review and evaluation  End-of-Life planning (with patient's consent)  Influenza Vaccine  Cardiovascular screening blood test  Bone mass measurement (DEXA)  Screening for glaucoma    Diagnoses and all orders for this visit:    1. Medicare annual wellness visit, subsequent      Health Maintenance Due   Topic Date Due    INFLUENZA AGE 9 TO ADULT  08/01/2017    COLONOSCOPY  02/01/2018     Began discussion today, SDM is her  Benton Murphy).     Discussed with patient about advance medical directive. Provided patient blank AMD and Your Right to Decide Booklet. Requested that if completed to provide a copy of AMD to office.        Colonoscopy: 2/04/13, Dr. Atif Urena, likely 10 year repeat, does not specify on report, advised to contact office  Pap: Dr. Alma Velazquez, , hysterectomy in 1970s  Mammogram: 19, negative  Dexa: declines     Tdap: declines  Pneumovax: 16  [id number]: 3/16/17  Zostavax: 10/26/12  Flu shot: 17    Hep C Screen: 3/17, negative  A1C: 3/17 5.6 due now  Lipids:  , ordered today    EK17, nsr    Eye exam: Dr. Anson Fleming, , scheduled for     Medication reconciliation completed by MA and reviewed by me. Medical/surgical/social/family history reviewed and updated by me. Patient provided AVS and preventative screening table. Patient verbalized understanding of all information discussed.

## 2017-09-21 ENCOUNTER — DOCUMENTATION ONLY (OUTPATIENT)
Dept: INTERNAL MEDICINE CLINIC | Age: 72
End: 2017-09-21

## 2017-09-21 LAB
ALBUMIN SERPL-MCNC: 4.5 G/DL (ref 3.5–4.8)
ALBUMIN/GLOB SERPL: 1.8 {RATIO} (ref 1.2–2.2)
ALP SERPL-CCNC: 80 IU/L (ref 39–117)
ALT SERPL-CCNC: 21 IU/L (ref 0–32)
AST SERPL-CCNC: 24 IU/L (ref 0–40)
BILIRUB SERPL-MCNC: 0.6 MG/DL (ref 0–1.2)
BUN SERPL-MCNC: 17 MG/DL (ref 8–27)
BUN/CREAT SERPL: 24 (ref 12–28)
CALCIUM SERPL-MCNC: 9.6 MG/DL (ref 8.7–10.3)
CHLORIDE SERPL-SCNC: 101 MMOL/L (ref 96–106)
CHOLEST SERPL-MCNC: 210 MG/DL (ref 100–199)
CO2 SERPL-SCNC: 24 MMOL/L (ref 18–29)
CREAT SERPL-MCNC: 0.71 MG/DL (ref 0.57–1)
EST. AVERAGE GLUCOSE BLD GHB EST-MCNC: 117 MG/DL
GLOBULIN SER CALC-MCNC: 2.5 G/DL (ref 1.5–4.5)
GLUCOSE SERPL-MCNC: 93 MG/DL (ref 65–99)
HBA1C MFR BLD: 5.7 % (ref 4.8–5.6)
HDLC SERPL-MCNC: 54 MG/DL
LDLC SERPL CALC-MCNC: 121 MG/DL (ref 0–99)
POTASSIUM SERPL-SCNC: 4.1 MMOL/L (ref 3.5–5.2)
PROT SERPL-MCNC: 7 G/DL (ref 6–8.5)
SODIUM SERPL-SCNC: 140 MMOL/L (ref 134–144)
TRIGL SERPL-MCNC: 175 MG/DL (ref 0–149)
TSH SERPL DL<=0.005 MIU/L-ACNC: 1.81 UIU/ML (ref 0.45–4.5)
VLDLC SERPL CALC-MCNC: 35 MG/DL (ref 5–40)

## 2017-09-21 NOTE — PROGRESS NOTES
Cholesterol worse increase lipitor from 20mg (verify dose) to 40mg    Repeat lipids in 6 weeks    Rest labs stable,

## 2017-09-21 NOTE — PROGRESS NOTES
Faxed medical clearance form to [facility] along with office note and labs. Fax number provided on form, [phone]. Fax confirmation received.

## 2017-09-25 ENCOUNTER — TELEPHONE (OUTPATIENT)
Dept: INTERNAL MEDICINE CLINIC | Age: 72
End: 2017-09-25

## 2017-09-25 NOTE — TELEPHONE ENCOUNTER
Patient states she needs a call back in reference to getting her most recent & last lab results mailed to her home that would also include her Cholesterol results. Please call.  Thank you

## 2017-09-25 NOTE — TELEPHONE ENCOUNTER
Called, spoke to pt. Two pt identifiers confirmed. Pt informed recent labs mailed out to pt. Pt verbalized understanding of information discussed w/ no further questions at this time.

## 2017-09-25 NOTE — PROGRESS NOTES
Called, spoke to pt. Two pt identifiers confirmed. Pt informed per Dr. Ray Cruz lipids worse; increase lipitor to 40mg. Pt declines at the moment stating that diet has been bad lately. Pt informed to repeat lipids in 6wks. Labs ordered and mailed to pt. Pt informed rest of labs stable. Pt verbalized understanding of information discussed w/ no further questions at this time.

## 2017-09-25 NOTE — LETTER
9/25/2017 10:15 AM 
 
Ms. Isabel Antonio [facility] [address] [id number] Results for orders placed or performed in visit on 09/20/17 LIPID PANEL Result Value Ref Range Cholesterol, total 210 (H) 100 - 199 mg/dL Triglyceride 175 (H) 0 - 149 mg/dL HDL Cholesterol 54 >39 mg/dL VLDL, calculated 35 5 - 40 mg/dL LDL, calculated 121 (H) 0 - 99 mg/dL METABOLIC PANEL, COMPREHENSIVE Result Value Ref Range Glucose 93 65 - 99 mg/dL BUN 17 8 - 27 mg/dL Creatinine 0.71 0.57 - 1.00 mg/dL GFR est non-AA 87 >59 mL/min/1.73 GFR est  >59 mL/min/1.73  
 BUN/Creatinine ratio 24 12 - 28 Sodium 140 134 - 144 mmol/L Potassium 4.1 3.5 - 5.2 mmol/L Chloride 101 96 - 106 mmol/L  
 CO2 24 18 - 29 mmol/L Calcium 9.6 8.7 - 10.3 mg/dL Protein, total 7.0 6.0 - 8.5 g/dL Albumin 4.5 3.5 - 4.8 g/dL GLOBULIN, TOTAL 2.5 1.5 - 4.5 g/dL A-G Ratio 1.8 1.2 - 2.2 Bilirubin, total 0.6 0.0 - 1.2 mg/dL Alk. phosphatase 80 39 - 117 IU/L  
 AST (SGOT) 24 0 - 40 IU/L  
 ALT (SGPT) 21 0 - 32 IU/L  
TSH 3RD GENERATION Result Value Ref Range TSH 1.810 0.450 - 4.500 uIU/mL HEMOGLOBIN A1C WITH EAG Result Value Ref Range Hemoglobin A1c 5.7 (H) 4.8 - 5.6 % Estimated average glucose 117 mg/dL Sincerely, 
 
 
Mariela Glaser MD

## 2018-03-09 RX ORDER — ATORVASTATIN CALCIUM 20 MG/1
TABLET, FILM COATED ORAL
Qty: 90 TAB | Refills: 2 | Status: SHIPPED | OUTPATIENT
Start: 2018-03-09 | End: 2018-11-29 | Stop reason: SDUPTHER

## 2018-03-09 NOTE — TELEPHONE ENCOUNTER
----- Message from Payton Shankar sent at 3/9/2018 10:51 AM EST -----  Regarding: Dr. Clau Morelos  Contact: [phone]  Pt requesting a new Rx for med \" Atorzastatin 20mg\" [facility].  Best contact [phone]

## 2018-06-14 ENCOUNTER — HOSPITAL ENCOUNTER (OUTPATIENT)
Dept: MAMMOGRAPHY | Age: 73
Discharge: HOME | End: 2018-06-14
Attending: INTERNAL MEDICINE
Payer: MEDICARE

## 2018-06-14 DIAGNOSIS — Z12.39 BREAST SCREENING: ICD-10-CM

## 2018-06-14 PROCEDURE — 77063 BREAST TOMOSYNTHESIS BI: CPT

## 2018-08-07 ENCOUNTER — OFFICE VISIT (OUTPATIENT)
Dept: INTERNAL MEDICINE CLINIC | Age: 73
End: 2018-08-07

## 2018-08-07 VITALS
TEMPERATURE: 97.8 F | DIASTOLIC BLOOD PRESSURE: 76 MMHG | HEIGHT: 62 IN | SYSTOLIC BLOOD PRESSURE: 131 MMHG | RESPIRATION RATE: 18 BRPM | BODY MASS INDEX: 23.45 KG/M2 | WEIGHT: 127.4 LBS | OXYGEN SATURATION: 93 % | HEART RATE: 87 BPM

## 2018-08-07 DIAGNOSIS — L50.9 HIVES: Primary | ICD-10-CM

## 2018-08-07 RX ORDER — PREDNISONE 10 MG/1
TABLET ORAL
Qty: 21 TAB | Refills: 0 | Status: SHIPPED | OUTPATIENT
Start: 2018-08-07 | End: 2018-09-24

## 2018-08-07 RX ORDER — HYDROXYZINE 25 MG/1
25 TABLET, FILM COATED ORAL
Qty: 30 TAB | Refills: 1 | Status: SHIPPED | OUTPATIENT
Start: 2018-08-07 | End: 2018-08-17

## 2018-08-07 NOTE — PATIENT INSTRUCTIONS
Hives: Care Instructions  Your Care Instructions  Hives are raised, red, itchy patches of skin. They are also called wheals or welts. They usually have red borders and pale centers. Hives range in size from ¼ inch to 3 inches or more across. They may seem to move from place to place on the skin. Several hives may form a large area of raised, red skin. You can get hives after an insect sting, after taking medicine or eating certain foods, or because of infection or stress. Other causes include plants, things you breathe in, makeup, heat, cold, sunlight, and latex. You cannot spread hives to other people. Hives may last a few minutes or a few days, but a single spot may last less than 36 hours. Follow-up care is a key part of your treatment and safety. Be sure to make and go to all appointments, and call your doctor if you are having problems. It's also a good idea to know your test results and keep a list of the medicines you take. How can you care for yourself at home? · Avoid whatever you think may have caused your hives, such as a certain food or medicine. However, you may not know the cause. · Put a cool, wet towel on the area to relieve itching. · Take an over-the-counter antihistamine, such as diphenhydramine (Benadryl), cetirizine (Zyrtec), or loratadine (Claritin), to help stop the hives and calm the itching. Read and follow directions on the label. These medicines can make you feel sleepy. Do not drive while using them. · Stay away from strong soaps, detergents, and chemicals. These can make itching worse. When should you call for help? Call 911 anytime you think you may need emergency care. For example, call if:    · You have symptoms of a severe allergic reaction. These may include:  ¨ Sudden raised, red areas (hives) all over your body. ¨ Swelling of the throat, mouth, lips, or tongue. ¨ Trouble breathing. ¨ Passing out (losing consciousness).  Or you may feel very lightheaded or suddenly feel weak, confused, or restless.    Call your doctor now or seek immediate medical care if:    · You have symptoms of an allergic reaction, such as:  ¨ A rash or hives (raised, red areas on the skin). ¨ Itching. ¨ Swelling. ¨ Belly pain, nausea, or vomiting.     · You get hives after you start a new medicine.     · Hives have not gone away after 24 hours.    Watch closely for changes in your health, and be sure to contact your doctor if:    · You do not get better as expected. Where can you learn more? Go to http://avPrevalent Networkselif.info/. Enter G006 in the search box to learn more about \"Hives: Care Instructions. \"  Current as of: November 20, 2017  Content Version: 11.7  © 0455-6570 [employer]. Care instructions adapted under license by [employer] (which disclaims liability or warranty for this information). If you have questions about a medical condition or this instruction, always ask your healthcare professional. [address] any warranty or liability for your use of this information.

## 2018-08-07 NOTE — PROGRESS NOTES
1. Have you been to the ER, urgent care clinic since your last visit? Hospitalized since your last visit? no    2. Have you seen or consulted any other health care providers outside of the [address] [place] [address] since your last visit? Include any pap smears or colon screening.  no

## 2018-08-07 NOTE — PROGRESS NOTES
SUBJECTIVE  Ms. Arnoldo Barrett is a patient of Margaretmary Saint, MD and presents today acutely for     Chief Complaint   Patient presents with    Rash     pt here today c.o rash on arms/thighs & knee's- pt c.o itch       Welts coming and going, on arms and legs. OTC cortisone cream isn't helping. She has a new odor product for towels. OBJECTIVE  Visit Vitals    /76 (BP 1 Location: Left arm, BP Patient Position: Sitting)    Pulse 87    Temp 97.8 °F (36.6 °C) (Oral)    Resp 18    Ht 5' 2\" (1.575 m)    Wt 127 lb 6.4 oz (57.8 kg)    SpO2 93%    BMI 23.3 kg/m2     Gen: Pleasant 72 y.o.  female in NAD.    H  HEART: RRR, No M/G/R.   LUNGS: CTAB No W/R.   ABDOMEN: S, NT, ND, BS+.   EXTREMITIES: Warm. SKIN: Warm. Dry. Raised round welts on arms and legs. ASSESSMENT / PLAN    ICD-10-CM ICD-9-CM    1. Hives L50.9 708.9 predniSONE (STERAPRED DS) 10 mg dose pack      hydrOXYzine HCl (ATARAX) 25 mg tablet     Avoid the new laundry product. I have reviewed with the patient details of the assessment and plan and all questions were answered. Relevant patient education was performed. Follow-up Disposition:  Return if symptoms worsen or fail to improve.

## 2018-08-07 NOTE — MR AVS SNAPSHOT
Anita [address] [facility] 
[phone] Patient: Elma Kate MRN: [id number] UEB:[id number] Visit Information Date & Time Provider Department Dept. Phone Encounter #  
 8/7/2018  2:45 PM Don Castellano, [address] [place] [address],[address] [phone] [id number] Follow-up Instructions Return if symptoms worsen or fail to improve. Your Appointments 9/24/2018  8:30 AM  
ROUTINE CARE with Eugene Moreno, [address] [place] [facility]) Appt Note: 1 year follow up  
 [facility] [address] [id number]  
[address] [facility] [address] [facility] Upcoming Health Maintenance Date Due  
 GLAUCOMA SCREENING Q2Y 12/9/2017 COLONOSCOPY 2/1/2018 Influenza Age 5 to Adult 8/1/2018 MEDICARE YEARLY EXAM 9/21/2018 BREAST CANCER SCRN MAMMOGRAM 6/14/2020 DTaP/Tdap/Td series (3 - Td) 2/3/2026 Allergies as of 8/7/2018  Review Complete On: 8/7/2018 By: Don Castellano MD  
 No Known Allergies Current Immunizations  Reviewed on 3/20/2017 Name Date Influenza High Dose Vaccine PF 9/20/2017 Influenza Vaccine 11/11/2013 Influenza Vaccine (Quad) PF 9/20/2016 Pneumococcal Conjugate (PCV-13) 3/16/2017 Pneumococcal Polysaccharide (PPSV-23) 9/20/2016 Tdap 2/3/2016, 11/10/2008 Zoster Vaccine, Live 10/26/2012 Not reviewed this visit You Were Diagnosed With   
  
 Codes Comments Hives    -  Primary ICD-10-CM: L50.9 ICD-9-CM: 708. 9 Vitals BP Pulse Temp Resp Height(growth percentile) Weight(growth percentile) 131/76 (BP 1 Location: Left arm, BP Patient Position: Sitting) 87 97.8 °F (36.6 °C) (Oral) 18 5' 2\" (1.575 m) 127 lb 6.4 oz (57.8 kg) SpO2 BMI OB Status Smoking Status 93% 23.3 kg/m2 Postmenopausal Former Smoker Vitals History BMI and BSA Data Body Mass Index Body Surface Area  
 23.3 kg/m 2 1.59 m 2 Preferred Pharmacy Pharmacy Name Phone [facility] [address], [facility] [address] [phone] Your Updated Medication List  
  
   
This list is accurate as of 8/7/18  2:53 PM.  Always use your most recent med list.  
  
  
  
  
 Acai Berry Extract 500 mg Cap Take 2 Caps by mouth daily. acetaminophen 325 mg tablet Commonly known as:  TYLENOL Take 325-650 mg by mouth every four (4) hours as needed for Pain. atorvastatin 20 mg tablet Commonly known as:  LIPITOR  
TAKE 1 TABLET NIGHTLY CALCIUM PO Take  by mouth. hydrOXYzine HCl 25 mg tablet Commonly known as:  ATARAX Take 1 Tab by mouth three (3) times daily as needed for Itching for up to 10 days. KRILL -09-59-50 mg Cap Generic drug:  krill-omega-3-dha-epa-lipids Take 1 Cap by mouth daily. loratadine 10 mg tablet Commonly known as:  Cara Weiss Take 10 mg by mouth.  
  
 multivitamin tablet Commonly known as:  ONE A DAY Take 1 Tab by mouth daily. predniSONE 10 mg dose pack Commonly known as:  STERAPRED DS See administration instruction per 10mg dose pack PROBIOTIC 4X 10-15 mg Tbec Generic drug:  B.infantis-B.ani-B.long-B.bifi Take  by mouth daily. Prescriptions Sent to Pharmacy Refills  
 predniSONE (STERAPRED DS) 10 mg dose pack 0 Sig: See administration instruction per 10mg dose pack Class: Normal  
 Pharmacy: [facility] [address] CHETAN ROBERSON [address], [address] [facility] Ph #: [phone]  
 hydrOXYzine HCl (ATARAX) 25 mg tablet 1 Sig: Take 1 Tab by mouth three (3) times daily as needed for Itching for up to 10 days. Class: Normal  
 Pharmacy: [facility] [address] CHETAN ROBERSON [address], [address] [place] [address] Avenue Ph #: [phone] Route: Oral  
  
Follow-up Instructions Return if symptoms worsen or fail to improve. Patient Instructions Hives: Care Instructions Your Care Instructions Hives are raised, red, itchy patches of skin. They are also called wheals or welts. They usually have red borders and pale centers. Hives range in size from ¼ inch to 3 inches or more across. They may seem to move from place to place on the skin. Several hives may form a large area of raised, red skin. You can get hives after an insect sting, after taking medicine or eating certain foods, or because of infection or stress. Other causes include plants, things you breathe in, makeup, heat, cold, sunlight, and latex. You cannot spread hives to other people. Hives may last a few minutes or a few days, but a single spot may last less than 36 hours. Follow-up care is a key part of your treatment and safety. Be sure to make and go to all appointments, and call your doctor if you are having problems. It's also a good idea to know your test results and keep a list of the medicines you take. How can you care for yourself at home? · Avoid whatever you think may have caused your hives, such as a certain food or medicine. However, you may not know the cause. · Put a cool, wet towel on the area to relieve itching. · Take an over-the-counter antihistamine, such as diphenhydramine (Benadryl), cetirizine (Zyrtec), or loratadine (Claritin), to help stop the hives and calm the itching. Read and follow directions on the label. These medicines can make you feel sleepy. Do not drive while using them. · Stay away from strong soaps, detergents, and chemicals. These can make itching worse. When should you call for help? Call 911 anytime you think you may need emergency care. For example, call if: 
  · You have symptoms of a severe allergic reaction. These may include: 
¨ Sudden raised, red areas (hives) all over your body. ¨ Swelling of the throat, mouth, lips, or tongue. ¨ Trouble breathing. ¨ Passing out (losing consciousness). Or you may feel very lightheaded or suddenly feel weak, confused, or restless.  
 Call your doctor now or seek immediate medical care if: 
  · You have symptoms of an allergic reaction, such as: ¨ A rash or hives (raised, red areas on the skin). ¨ Itching. ¨ Swelling. ¨ Belly pain, nausea, or vomiting.  
  · You get hives after you start a new medicine.  
  · Hives have not gone away after 24 hours.  
 Watch closely for changes in your health, and be sure to contact your doctor if: 
  · You do not get better as expected. Where can you learn more? Go to http://av-elif.info/. Enter W307 in the search box to learn more about \"Hives: Care Instructions. \" Current as of: November 20, 2017 Content Version: 11.7 © 7272-7199 [employer]. Care instructions adapted under license by [employer] (which disclaims liability or warranty for this information). If you have questions about a medical condition or this instruction, always ask your healthcare professional. [address] any warranty or liability for your use of this information. Introducing [facility] HE[facility]! Mehnaz Persaud introduces [employer] patient portal. Now you can access parts of your medical record, email your doctor's office, and request medication refills online. 1. In your internet browser, go to [email or web address] 2. Click on the First Time User? Click Here link in the Sign In box. You will see the New Member Sign Up page. 3. Enter your [employer] Access Code exactly as it appears below. You will not need to use this code after youve completed the sign-up process. If you do not sign up before the expiration date, you must request a new code. · [employer] Access Code: [email or web address] Expires: 9/11/2018  5:34 PM 
 
4.  Enter the last four digits of your Social Security Number (xxxx) and Date of Birth (mm/dd/yyyy) as indicated and click Submit. You will be taken to the next sign-up page. 5. Create a [employer] ID. This will be your [employer] login ID and cannot be changed, so think of one that is secure and easy to remember. 6. Create a [employer] password. You can change your password at any time. 7. Enter your Password Reset Question and Answer. This can be used at a later time if you forget your password. 8. Enter your e-mail address. You will receive e-mail notification when new information is available in [address] 9. Click Sign Up. You can now view and download portions of your medical record. 10. Click the Download Summary menu link to download a portable copy of your medical information. If you have questions, please visit the Frequently Asked Questions section of the [employer] website. Remember, [employer] is NOT to be used for urgent needs. For medical emergencies, dial 911. Now available from your iPhone and Android! Please provide this summary of care documentation to your next provider. Your primary care clinician is listed as Amos Moreno. If you have any questions after today's visit, please call [phone].

## 2018-09-19 ENCOUNTER — DOCUMENTATION ONLY (OUTPATIENT)
Dept: INTERNAL MEDICINE CLINIC | Age: 73
End: 2018-09-19

## 2018-09-19 NOTE — PROGRESS NOTES
Medicare Part B Preventive Services Limitations Recommendation Scheduled   Bone Mass Measurement  (age 72 & older, biennial) Requires diagnosis related to osteoporosis or estrogen deficiency. Biennial benefit unless patient has history of long-term glucocorticoid tx or baseline is needed because initial test was by other method Sometime in 2014      Recommended every 2 years Declines further   Cardiovascular Screening Blood Tests (every 5 years)  Total cholesterol, HDL, Triglycerides Order as a panel if possible Completed 9/2017      Recommended every year Due 9/2018   Colorectal Cancer Screening  -Fecal occult blood test (annual)  -Flexible sigmoidoscopy (5y)  -Screening colonoscopy (10y)  -Barium Enema    Completed in 2/2013 with Dr. Alicia Oviedo      Repeat in 5-10 years  Due after 2018     Call dr Quinones Flank office to find out when to repeat this   Counseling to Prevent Tobacco Use (up to 8 sessions per year)  - Counseling greater than 3 and up to 10 minutes  - Counseling greater than 10 minutes Patients must be asymptomatic of tobacco-related conditions to receive as preventive service Quit in the 1970s N/A   Diabetes Screening Tests (at least every 3 years, Medicare covers annually or at 6-month intervals for prediabetic patients)      Fasting blood sugar (FBS) or glucose tolerance test (GTT) Patient must be diagnosed with one of the following:  -Hypertension, Dyslipidemia, obesity, previous impaired FBS or GTT  Or any two of the following: overweight, FH of diabetes, age ? 72, history of gestational diabetes, birth of baby weighing more than 9 pounds Completed 9/2017 with A1C 5.7      Recommended every 3-6 months for pre-diabetics  Due now   Diabetes Self-Management Training (DSMT) (no USPSTF recommendation) Requires referral by treating physician for patient with diabetes or renal disease. 10 hours of initial DSMT session of no less than 30 minutes each in a continuous 12-month period.  2 hours of follow-up DSMT in subsequent years. N/A N/A   Glaucoma Screening (no USPSTF recommendation) Diabetes mellitus, family history, , age 48 or over,  American, age 72 or over Completed 12/2016      Recommended annually Due now   Human Immunodeficiency Virus (HIV) Screening (annually for increased risk patients)  HIV-1 and HIV-2 by EIA, KEVIN, rapid antibody test, or oral mucosa transudate Patient must be at increased risk for HIV infection per USPSTF guidelines or pregnant. Tests covered annually for patients at increased risk. Pregnant patients may receive up to 3 test during pregnancy. N/A N/A   Medical Nutrition Therapy (MNT) (for diabetes or renal disease not recommended schedule) Requires referral by treating physician for patient with diabetes or renal disease. Can be provided in same year as diabetes self-management training (DSMT), and CMS recommends medical nutrition therapy take place after DSMT. Up to 3 hours for initial year and 2 hours in subsequent years. N/A N/A         Seasonal Influenza Vaccination (annually)    Completed 9/17      Recommended every year Due Fall 2018   Pneumococcal Vaccination (once after 72)    [id number] - 9/20/16      [id number] - 3/16/17      Both recommended once over the age of 72 Completed          Completed   Hepatitis B Vaccinations (if medium/high risk) Medium/high risk factors: End-stage renal disease,  Hemophiliacs who received Factor VIII or IX concentrates, Clients of institutions for the mentally retarded, Persons who live in the same house as a HepB virus carrier, Homosexual men, Illicit injectable drug abusers. N/A N/A   Screening Mammography (biennial age 54-69)?  Annually (age 36 or over) Completed 6/2018      Recommended every year Due 6/2019   Screening Pap Tests and Pelvic Examination (up to age 79 and after 79 if unknown history or abnormal study last 10 years) Every 24 months except high risk Completed 4/2017      Recommended every 2 years Due 4/2019 Ultrasound Screening for Abdominal Aortic Aneurysm (AAA) (once) Patient must be referred through [facility] and not have had a screening for abdominal aortic aneurysm before under Medicare.  Limited to patients who meet one of the following criteria:  - Men who are 73-68 years old and have smoked more than 100 cigarettes in their lifetime.  -Anyone with a FH of AAA  -Anyone recommended for screening by USPSTF N/A N/A

## 2018-09-24 ENCOUNTER — OFFICE VISIT (OUTPATIENT)
Dept: INTERNAL MEDICINE CLINIC | Age: 73
End: 2018-09-24

## 2018-09-24 ENCOUNTER — HOSPITAL ENCOUNTER (OUTPATIENT)
Dept: LAB | Age: 73
Discharge: HOME | End: 2018-09-24
Payer: MEDICARE

## 2018-09-24 VITALS
RESPIRATION RATE: 16 BRPM | HEIGHT: 62 IN | WEIGHT: 124 LBS | OXYGEN SATURATION: 97 % | DIASTOLIC BLOOD PRESSURE: 69 MMHG | SYSTOLIC BLOOD PRESSURE: 119 MMHG | HEART RATE: 90 BPM | BODY MASS INDEX: 22.82 KG/M2 | TEMPERATURE: 97.5 F

## 2018-09-24 DIAGNOSIS — E78.1 PURE HYPERGLYCERIDEMIA: ICD-10-CM

## 2018-09-24 DIAGNOSIS — Z00.00 MEDICARE ANNUAL WELLNESS VISIT, SUBSEQUENT: ICD-10-CM

## 2018-09-24 DIAGNOSIS — D17.1 LIPOMA OF TORSO: ICD-10-CM

## 2018-09-24 DIAGNOSIS — C50.911 MALIGNANT NEOPLASM OF RIGHT FEMALE BREAST, UNSPECIFIED ESTROGEN RECEPTOR STATUS, UNSPECIFIED SITE OF BREAST (HCC): Primary | ICD-10-CM

## 2018-09-24 DIAGNOSIS — E55.9 VITAMIN D DEFICIENCY: ICD-10-CM

## 2018-09-24 DIAGNOSIS — Z23 ENCOUNTER FOR IMMUNIZATION: ICD-10-CM

## 2018-09-24 DIAGNOSIS — R21 RASH: ICD-10-CM

## 2018-09-24 DIAGNOSIS — R73.01 IFG (IMPAIRED FASTING GLUCOSE): ICD-10-CM

## 2018-09-24 PROCEDURE — 36415 COLL VENOUS BLD VENIPUNCTURE: CPT

## 2018-09-24 PROCEDURE — 82306 VITAMIN D 25 HYDROXY: CPT

## 2018-09-24 PROCEDURE — 83036 HEMOGLOBIN GLYCOSYLATED A1C: CPT

## 2018-09-24 PROCEDURE — 85027 COMPLETE CBC AUTOMATED: CPT

## 2018-09-24 PROCEDURE — 80053 COMPREHEN METABOLIC PANEL: CPT

## 2018-09-24 PROCEDURE — 80061 LIPID PANEL: CPT

## 2018-09-24 RX ORDER — CETIRIZINE HCL 10 MG
TABLET ORAL
COMMUNITY
Start: 2018-09-07

## 2018-09-24 RX ORDER — HYDROXYZINE 25 MG/1
TABLET, FILM COATED ORAL
COMMUNITY
Start: 2018-09-07 | End: 2018-11-19

## 2018-09-24 NOTE — PATIENT INSTRUCTIONS
Medicare Part B Preventive Services Limitations Recommendation Scheduled   Bone Mass Measurement  (age 72 & older, biennial) Requires diagnosis related to osteoporosis or estrogen deficiency. Biennial benefit unless patient has history of long-term glucocorticoid tx or baseline is needed because initial test was by other method Sometime in 2014      Recommended every 2 years Declines further   Cardiovascular Screening Blood Tests (every 5 years)  Total cholesterol, HDL, Triglycerides Order as a panel if possible Completed 9/2017      Recommended every year Due 9/2018   Colorectal Cancer Screening  -Fecal occult blood test (annual)  -Flexible sigmoidoscopy (5y)  -Screening colonoscopy (10y)  -Barium Enema    Completed in 2/2013 with Dr. Nancy Veliz      Repeat in 5-10 years  Due after 2018      Call dr Gagan Reynolds office to find out when to repeat this   Counseling to Prevent Tobacco Use (up to 8 sessions per year)  - Counseling greater than 3 and up to 10 minutes  - Counseling greater than 10 minutes Patients must be asymptomatic of tobacco-related conditions to receive as preventive service Quit in the 1970s N/A   Diabetes Screening Tests (at least every 3 years, Medicare covers annually or at 6-month intervals for prediabetic patients)      Fasting blood sugar (FBS) or glucose tolerance test (GTT) Patient must be diagnosed with one of the following:  -Hypertension, Dyslipidemia, obesity, previous impaired FBS or GTT  Or any two of the following: overweight, FH of diabetes, age ? 72, history of gestational diabetes, birth of baby weighing more than 9 pounds Completed 9/2017 with A1C 5.7      Recommended every 3-6 months for pre-diabetics  Due now   Diabetes Self-Management Training (DSMT) (no USPSTF recommendation) Requires referral by treating physician for patient with diabetes or renal disease. 10 hours of initial DSMT session of no less than 30 minutes each in a continuous 12-month period.  2 hours of follow-up DSMT in subsequent years. N/A N/A   Glaucoma Screening (no USPSTF recommendation) Diabetes mellitus, family history, , age 48 or over,  American, age 72 or over Completed 12/2017      Recommended annually Due 12/18   Human Immunodeficiency Virus (HIV) Screening (annually for increased risk patients)  HIV-1 and HIV-2 by EIA, KEVIN, rapid antibody test, or oral mucosa transudate Patient must be at increased risk for HIV infection per USPSTF guidelines or pregnant. Tests covered annually for patients at increased risk. Pregnant patients may receive up to 3 test during pregnancy. N/A N/A   Medical Nutrition Therapy (MNT) (for diabetes or renal disease not recommended schedule) Requires referral by treating physician for patient with diabetes or renal disease. Can be provided in same year as diabetes self-management training (DSMT), and CMS recommends medical nutrition therapy take place after DSMT. Up to 3 hours for initial year and 2 hours in subsequent years. N/A N/A             Seasonal Influenza Vaccination (annually)    Completed 9/18      Recommended every year Due Fall 2019   Pneumococcal Vaccination (once after 72)    [id number] - 9/20/16      [id number] - 3/16/17      Both recommended once over the age of 72 Completed          Completed   Hepatitis B Vaccinations (if medium/high risk) Medium/high risk factors: End-stage renal disease,  Hemophiliacs who received Factor VIII or IX concentrates, Clients of institutions for the mentally retarded, Persons who live in the same house as a HepB virus carrier, Homosexual men, Illicit injectable drug abusers. N/A N/A   Screening Mammography (biennial age 54-69)?  Annually (age 36 or over) Completed 6/2018      Recommended every year Due 6/2019   Screening Pap Tests and Pelvic Examination (up to age 79 and after 79 if unknown history or abnormal study last 10 years) Every 24 months except high risk Completed 4/2017      Recommended every 2 years Due 4/2019   Ultrasound Screening for Abdominal Aortic Aneurysm (AAA) (once) Patient must be referred through [facility] and not have had a screening for abdominal aortic aneurysm before under Medicare. Limited to patients who meet one of the following criteria:  - Men who are 73-68 years old and have smoked more than 100 cigarettes in their lifetime.  -Anyone with a FH of AAA  -Anyone recommended for screening by USPSTF N/A N/A                     Medicare Wellness Visit, Female     The best way to live healthy is to have a lifestyle where you eat a well-balanced diet, exercise regularly, limit alcohol use, and quit all forms of tobacco/nicotine, if applicable. Regular preventive services are another way to keep healthy. Preventive services (vaccines, screening tests, monitoring & exams) can help personalize your care plan, which helps you manage your own care. Screening tests can find health problems at the earliest stages, when they are easiest to treat. [facility] follows the current, evidence-based guidelines published by the [facility] (USPSTF) when recommending preventive services for our patients. Because we follow these guidelines, sometimes recommendations change over time as research supports it. (For example, mammograms used to be recommended annually. Even though Medicare will still pay for an annual mammogram, the newer guidelines recommend a mammogram every two years for women of average risk.)  Of course, you and your doctor may decide to screen more often for some diseases, based on your risk and your health status. Preventive services for you include:  - Medicare offers their members a free annual wellness visit, which is time for you and your primary care provider to discuss and plan for your preventive service needs. Take advantage of this benefit every year!  -All adults over the age of 72 should receive the recommended pneumonia vaccines.  Current USPSTF guidelines recommend a series of two vaccines for the best pneumonia protection.   -All adults should have a flu vaccine yearly and a tetanus vaccine every 10 years. All adults age 61 and older should receive a shingles vaccine once in their lifetime.    -A bone mass density test is recommended when a woman turns 65 to screen for osteoporosis. This test is only recommended one time, as a screening. Some providers will use this same test as a disease monitoring tool if you already have osteoporosis. -All adults age 38-68 who are overweight should have a diabetes screening test once every three years.   -Other screening tests and preventive services for persons with diabetes include: an eye exam to screen for diabetic retinopathy, a kidney function test, a foot exam, and stricter control over your cholesterol.   -Cardiovascular screening for adults with routine risk involves an electrocardiogram (ECG) at intervals determined by your doctor.   -Colorectal cancer screenings should be done for adults age 54-65 with no increased risk factors for colorectal cancer. There are a number of acceptable methods of screening for this type of cancer. Each test has its own benefits and drawbacks. Discuss with your doctor what is most appropriate for you during your annual wellness visit. The different tests include: colonoscopy (considered the best screening method), a fecal occult blood test, a fecal DNA test, and sigmoidoscopy. -Breast cancer screenings are recommended every other year for women of normal risk, age 54-69.  -Cervical cancer screenings for women over age 72 are only recommended with certain risk factors.   -All adults born between [facility] should be screened once for Hepatitis C.      Here is a list of your current Health Maintenance items (your personalized list of preventive services) with a due date:  Health Maintenance Due   Topic Date Due    Glaucoma Screening   12/09/2017    Colonoscopy  02/01/2018    Flu Vaccine  08/01/2018    Annual Well Visit  09/21/2018       ADD ZANTAC

## 2018-09-24 NOTE — PROGRESS NOTES
This is the Subsequent Medicare Annual Wellness Exam, performed 12 months or more after the Initial AWV or the last Subsequent AWV    I have reviewed the patient's medical history in detail and updated the computerized patient record. History     Past Medical History:   Diagnosis Date    Breast cancer ([facility] [address]     right lumpectomy    Cancer ([facility] [address]     right breast cancer    Hypercholesterolemia     Other ill-defined conditions(799.89)     high cholesterol    Personal history of colonic polyps 2013      Past Surgical History:   Procedure Laterality Date    BREAST SURGERY PROCEDURE UNLISTED      right lumpectomy    HX BREAST BIOPSY Right 2009    HX BREAST LUMPECTOMY Right 0    no radiation or chemo    HX GYN      hysterectomy 70's     Current Outpatient Prescriptions   Medication Sig Dispense Refill    atorvastatin (LIPITOR) 20 mg tablet TAKE 1 TABLET NIGHTLY 90 Tab 2    B.infantis-B.ani-B.long-B.bifi (PROBIOTIC 4X) 10-15 mg TbEC Take  by mouth daily.  loratadine (CLARITIN) 10 mg tablet Take 10 mg by mouth.  CALCIUM PO Take  by mouth.  acetaminophen (TYLENOL) 325 mg tablet Take 325-650 mg by mouth every four (4) hours as needed for Pain.  krill-omega-3-dha-epa-lipids (KRILL OIL) 194-03-72-03 mg cap Take 1 Cap by mouth daily.  Acai Berry Extract 500 mg cap Take 2 Caps by mouth daily.  multivitamin (ONE A DAY) tablet Take 1 Tab by mouth daily.          No Known Allergies  Family History   Problem Relation Age of Onset    Heart Disease Mother       from mi   Embo.Graven Diabetes Father     Diabetes Sister     Heart Disease Sister      heart failure    Liver Disease Sister     Heart Disease Son      Stent placed     Social History   Substance Use Topics    Smoking status: Former Smoker    Smokeless tobacco: Never Used    Alcohol use Yes      Comment: seldom     Patient Active Problem List   Diagnosis Code    Personal history of colonic polyps Z86.010    Breast cancer ([address] C50.919    Pure hyperglyceridemia E78.1    Prediabetes R73.03       Depression Risk Factor Screening:     PHQ over the last two weeks 9/24/2018   Little interest or pleasure in doing things Not at all   Feeling down, depressed, irritable, or hopeless Not at all   Total Score PHQ 2 0     Alcohol Risk Factor Screening: You do not drink alcohol or very rarely. Functional Ability and Level of Safety:   Hearing Loss  Hearing is good. Activities of Daily Living  The home contains: no safety equipment. Patient does total self care    Fall Risk  Fall Risk Assessment, last 12 mths 9/24/2018   Able to walk? Yes   Fall in past 12 months? No       Abuse Screen  Patient is not abused  Lives with    Cognitive Screening   Evaluation of Cognitive Function:  Has your family/caregiver stated any concerns about your memory: no  Normal    Patient Care Team   Patient Care Team:  Rufina Rodríguez MD as PCP - General (Internal Medicine)  Jessica Sánchez MD (Hematology and Oncology)  Tana Albert DPM (Podiatry)  Dian Ma OD (Ophthalmology)  CHARITO Dennis MD (Gastroenterology)  Omkar Bond MD (Gynecology)   Dr Lulu Lopez  Dermatology   updated  Assessment/Plan   Education and counseling provided:  Are appropriate based on today's review and evaluation  End-of-Life planning (with patient's consent)  Influenza Vaccine  Cardiovascular screening blood test  Bone mass measurement (DEXA)  Screening for glaucoma  Diabetes screening test    Diagnoses and all orders for this visit:    1. Malignant neoplasm of right female breast, unspecified estrogen receptor status, unspecified site of breast ([address]  -     LIPID PANEL  -     METABOLIC PANEL, COMPREHENSIVE  -     CBC W/O DIFF  -     TSH 3RD GENERATION  -     HEMOGLOBIN A1C WITH EAG  -     VITAMIN D, 25 HYDROXY    2.  Pure hyperglyceridemia  -     LIPID PANEL  -     METABOLIC PANEL, COMPREHENSIVE  -     CBC W/O DIFF  -     TSH 3RD GENERATION  -     HEMOGLOBIN A1C WITH EAG  -     VITAMIN D, 25 HYDROXY    3. IFG (impaired fasting glucose)  -     LIPID PANEL  -     METABOLIC PANEL, COMPREHENSIVE  -     CBC W/O DIFF  -     TSH 3RD GENERATION  -     HEMOGLOBIN A1C WITH EAG  -     VITAMIN D, 25 HYDROXY    4. Vitamin D deficiency  -     LIPID PANEL  -     METABOLIC PANEL, COMPREHENSIVE  -     CBC W/O DIFF  -     TSH 3RD GENERATION  -     HEMOGLOBIN A1C WITH EAG  -     VITAMIN D, 25 HYDROXY    5. History of weight change  -     TSH 3RD GENERATION    6. Medicare annual wellness visit, subsequent        Health Maintenance Due   Topic Date Due    GLAUCOMA SCREENING Q2Y  2017    COLONOSCOPY  2018    Influenza Age 9 to Adult  2018    MEDICARE YEARLY EXAM  2018     Discussed with patient about advance medical directive. Provided patient blank AMD and Your Right to Decide Booklet. Requested that if completed to provide a copy of AMD to office. ACP not on file. SDM is her  Herbie Velazco).       Colonoscopy: 13, Dr. Kerrin Bence, likely 10 year repeat, does not specify on report, advised to contact office  Pap: Valentin Whitfield, , hysterectomy in 1970s  Mammogram: 18, negative  Dexa: declines     Tdap: declines  Pneumovax: 16  Mndmkyd20: 3/16/17  Zostavax: 10/26/12  Shingrix: 1st round completed   Flu shot: 18     Eye exam: Dr. Jose Pritchett, , scheduled for     Hep C Screen: 3/17, negative  A1C:   5.7  Due now  Lipids:   due now  EK17, nsr    Medication reconciliation completed by MA and reviewed by me. Medical/surgical/social/family history reviewed and updated by me. Patient provided AVS and preventative screening table. Patient verbalized understanding of all information discussed.

## 2018-09-24 NOTE — MR AVS SNAPSHOT
[facility] [address] [place] [address] [facility] 
[phone] Patient: Soledad  MRN: [id number]  Visit Information Date & Time Provider Department Dept. Phone Encounter #  
 2018  8:30 AM Kenya Lynn, [address] [place] [address],[address] [phone] [id number] Upcoming Health Maintenance Date Due  
 GLAUCOMA SCREENING Q2Y 2017 COLONOSCOPY 2018 Influenza Age 5 to Adult 2018 MEDICARE YEARLY EXAM 2018 BREAST CANCER SCRN MAMMOGRAM 2020 DTaP/Tdap/Td series (3 - Td) 2/3/2026 Allergies as of 2018  Review Complete On: 2018 By: Kenya Lynn MD  
 No Known Allergies Current Immunizations  Reviewed on 2018 Name Date Influenza High Dose Vaccine PF 2017 Influenza Vaccine 2013 Influenza Vaccine (Quad) PF 2016 Pneumococcal Conjugate (PCV-13) 3/16/2017 Pneumococcal Polysaccharide (PPSV-23) 2016 Tdap 2/3/2016, 11/10/2008 Zoster Recombinant 2018 Zoster Vaccine, Live 10/26/2012 Reviewed by Kenya Lynn MD on 2018 at  8:46 AM  
You Were Diagnosed With   
  
 Codes Comments Malignant neoplasm of right female breast, unspecified estrogen receptor status, unspecified site of breast ([facility] [address]    -  Primary ICD-10-CM: C50.911 ICD-9-CM: 174.9 Pure hyperglyceridemia     ICD-10-CM: E78.1 ICD-9-CM: 272.1 IFG (impaired fasting glucose)     ICD-10-CM: R73.01 
ICD-9-CM: 790.21 Vitamin D deficiency     ICD-10-CM: E55.9 ICD-9-CM: 268.9 History of weight change     ICD-10-CM: Z91.89 ICD-9-CM: V15.89 Medicare annual wellness visit, subsequent     ICD-10-CM: Z00.00 ICD-9-CM: V70.0 Rash     ICD-10-CM: R21 
ICD-9-CM: 782.1 Lipoma of torso     ICD-10-CM: D17.1 ICD-9-CM: 214.1 Vitals BP Pulse Temp Resp Height(growth percentile) Weight(growth percentile) 119/69 (BP 1 Location: Left arm, BP Patient Position: Sitting) 90 97.5 °F (36.4 °C) (Oral) 16 5' 2\" (1.575 m) 124 lb (56.2 kg) SpO2 BMI OB Status Smoking Status 97% 22.68 kg/m2 Postmenopausal Former Smoker Vitals History BMI and BSA Data Body Mass Index Body Surface Area  
 22.68 kg/m 2 1.57 m 2 Preferred Pharmacy Pharmacy Name Phone [facility] [address], [address] [place] [address] Avenue [phone] Your Updated Medication List  
  
   
This list is accurate as of 9/24/18  8:50 AM.  Always use your most recent med list.  
  
  
  
  
 Acai Berry Extract 500 mg Cap Take 2 Caps by mouth daily. acetaminophen 325 mg tablet Commonly known as:  TYLENOL Take 325-650 mg by mouth every four (4) hours as needed for Pain. atorvastatin 20 mg tablet Commonly known as:  LIPITOR  
TAKE 1 TABLET NIGHTLY CALCIUM PO Take  by mouth. cetirizine 10 mg tablet Commonly known as:  ZYRTEC  
  
 hydrOXYzine HCl 25 mg tablet Commonly known as:  ATARAX KRILL -86-03-50 mg Cap Generic drug:  krill-omega-3-dha-epa-lipids Take 1 Cap by mouth daily. loratadine 10 mg tablet Commonly known as:  Reyes Uriel Take 10 mg by mouth.  
  
 multivitamin tablet Commonly known as:  ONE A DAY Take 1 Tab by mouth daily. PROBIOTIC 4X 10-15 mg Tbec Generic drug:  B.infantis-B.ani-B.long-B.bifi Take  by mouth daily. We Performed the Following CBC W/O DIFF [24780 CPT(R)] HEMOGLOBIN A1C WITH EAG [46874 CPT(R)] LIPID PANEL [77513 CPT(R)] METABOLIC PANEL, COMPREHENSIVE [75115 CPT(R)] REFERRAL TO GENERAL SURGERY [REF27 Custom] TSH 3RD GENERATION [28307 CPT(R)] VITAMIN D, 25 HYDROXY [id number] CPT(R)] Referral Information Referral ID Referred By Referred To  
  
 [id number] Edson Griffin MD   
   [address] [place] [address] [address] [place] [address] [address] [facility] Phone: [phone] Fax: [phone] Visits Status Start Date End Date 1 New Request 9/24/18 9/24/19 If your referral has a status of pending review or denied, additional information will be sent to support the outcome of this decision. Patient Instructions Medicare Part B Preventive Services Limitations Recommendation Scheduled Bone Mass Measurement 
(age 72 & older, biennial) Requires diagnosis related to osteoporosis or estrogen deficiency. Biennial benefit unless patient has history of long-term glucocorticoid tx or baseline is needed because initial test was by other method Sometime in 2014 
   
Recommended every 2 years Declines further Cardiovascular Screening Blood Tests (every 5 years) Total cholesterol, HDL, Triglycerides Order as a panel if possible Completed 9/2017 
   
Recommended every year Due 9/2018 Colorectal Cancer Screening 
-Fecal occult blood test (annual) -Flexible sigmoidoscopy (5y) 
-Screening colonoscopy (10y) -Barium Enema    Completed in 2/2013 with Dr. Kay Reyes 
   
Repeat in 5-10 years  Due after 2018 
   
Call dr Taina Mathew office to find out when to repeat this Counseling to Prevent Tobacco Use (up to 8 sessions per year) - Counseling greater than 3 and up to 10 minutes - Counseling greater than 10 minutes Patients must be asymptomatic of tobacco-related conditions to receive as preventive service Quit in the 1970s N/A Diabetes Screening Tests (at least every 3 years, Medicare covers annually or at 6-month intervals for prediabetic patients) 
   
Fasting blood sugar (FBS) or glucose tolerance test (GTT) Patient must be diagnosed with one of the following: 
-Hypertension, Dyslipidemia, obesity, previous impaired FBS or GTT 
Or any two of the following: overweight, FH of diabetes, age ? 72, history of gestational diabetes, birth of baby weighing more than 9 pounds Completed 9/2017 with A1C 5.7 
   
 Recommended every 3-6 months for pre-diabetics  Due now Diabetes Self-Management Training (DSMT) (no USPSTF recommendation) Requires referral by treating physician for patient with diabetes or renal disease. 10 hours of initial DSMT session of no less than 30 minutes each in a continuous 12-month period. 2 hours of follow-up DSMT in subsequent years. N/A N/A Glaucoma Screening (no USPSTF recommendation) Diabetes mellitus, family history, , age 48 or over,  American, age 72 or over Completed 12/2017 
   
Recommended annually Due 12/18 Human Immunodeficiency Virus (HIV) Screening (annually for increased risk patients) HIV-1 and HIV-2 by EIA, KEVIN, rapid antibody test, or oral mucosa transudate Patient must be at increased risk for HIV infection per USPSTF guidelines or pregnant. Tests covered annually for patients at increased risk. Pregnant patients may receive up to 3 test during pregnancy. N/A N/A Medical Nutrition Therapy (MNT) (for diabetes or renal disease not recommended schedule) Requires referral by treating physician for patient with diabetes or renal disease. Can be provided in same year as diabetes self-management training (DSMT), and CMS recommends medical nutrition therapy take place after DSMT. Up to 3 hours for initial year and 2 hours in subsequent years. N/A N/A  
         
Seasonal Influenza Vaccination (annually)    Completed 9/18 
   
Recommended every year Due Fall 2019 Pneumococcal Vaccination (once after 65)    [id number] - 9/20/16 
   
[id number] - 3/16/17 
   
Both recommended once over the age of 72 Completed    
   
Completed Hepatitis B Vaccinations (if medium/high risk) Medium/high risk factors: End-stage renal disease, Hemophiliacs who received Factor VIII or IX concentrates, Clients of institutions for the mentally retarded, Persons who live in the same house as a HepB virus carrier, Homosexual men, Illicit injectable drug abusers.  N/A N/A  
 Screening Mammography (biennial age 54-69)? Annually (age 36 or over) Completed 6/2018 
   
Recommended every year Due 6/2019 Screening Pap Tests and Pelvic Examination (up to age 79 and after 79 if unknown history or abnormal study last 10 years) Every 24 months except high risk Completed 4/2017 
   
Recommended every 2 years Due 4/2019 Ultrasound Screening for Abdominal Aortic Aneurysm (AAA) (once) Patient must be referred through IPPE and not have had a screening for abdominal aortic aneurysm before under Medicare. Limited to patients who meet one of the following criteria: 
- Men who are 73-68 years old and have smoked more than 100 cigarettes in their lifetime. 
-Anyone with a FH of AAA 
-Anyone recommended for screening by USPSTF N/A N/A  
   
  
    
 
 
 
Medicare Wellness Visit, Female The best way to live healthy is to have a lifestyle where you eat a well-balanced diet, exercise regularly, limit alcohol use, and quit all forms of tobacco/nicotine, if applicable. Regular preventive services are another way to keep healthy. Preventive services (vaccines, screening tests, monitoring & exams) can help personalize your care plan, which helps you manage your own care. Screening tests can find health problems at the earliest stages, when they are easiest to treat. [facility] follows the current, evidence-based guidelines published by the [facility] [place] (USPSTF) when recommending preventive services for our patients. Because we follow these guidelines, sometimes recommendations change over time as research supports it. (For example, mammograms used to be recommended annually. Even though Medicare will still pay for an annual mammogram, the newer guidelines recommend a mammogram every two years for women of average risk.) Of course, you and your doctor may decide to screen more often for some diseases, based on your risk and your health status. Preventive services for you include: - Medicare offers their members a free annual wellness visit, which is time for you and your primary care provider to discuss and plan for your preventive service needs. Take advantage of this benefit every year! 
-All adults over the age of 72 should receive the recommended pneumonia vaccines. Current USPSTF guidelines recommend a series of two vaccines for the best pneumonia protection.  
-All adults should have a flu vaccine yearly and a tetanus vaccine every 10 years. All adults age 61 and older should receive a shingles vaccine once in their lifetime.   
-A bone mass density test is recommended when a woman turns 65 to screen for osteoporosis. This test is only recommended one time, as a screening. Some providers will use this same test as a disease monitoring tool if you already have osteoporosis. -All adults age 38-68 who are overweight should have a diabetes screening test once every three years.  
-Other screening tests and preventive services for persons with diabetes include: an eye exam to screen for diabetic retinopathy, a kidney function test, a foot exam, and stricter control over your cholesterol.  
-Cardiovascular screening for adults with routine risk involves an electrocardiogram (ECG) at intervals determined by your doctor.  
-Colorectal cancer screenings should be done for adults age 54-65 with no increased risk factors for colorectal cancer. There are a number of acceptable methods of screening for this type of cancer. Each test has its own benefits and drawbacks. Discuss with your doctor what is most appropriate for you during your annual wellness visit. The different tests include: colonoscopy (considered the best screening method), a fecal occult blood test, a fecal DNA test, and sigmoidoscopy. -Breast cancer screenings are recommended every other year for women of normal risk, age 54-69. -Cervical cancer screenings for women over age 72 are only recommended with certain risk factors.  
-All adults born between [facility] should be screened once for Hepatitis C. Here is a list of your current Health Maintenance items (your personalized list of preventive services) with a due date: 
Health Maintenance Due Topic Date Due  Glaucoma Screening   12/09/2017  Colonoscopy  02/01/2018  Flu Vaccine  08/01/2018 Karl Valles Annual Well Visit  09/21/2018 ADD ZANTAC Introducing [facility]! [facility] introduces [employer] patient portal. Now you can access parts of your medical record, email your doctor's office, and request medication refills online. 1. In your internet browser, go to [email or web address] 2. Click on the First Time User? Click Here link in the Sign In box. You will see the New Member Sign Up page. 3. Enter your [employer] Access Code exactly as it appears below. You will not need to use this code after youve completed the sign-up process. If you do not sign up before the expiration date, you must request a new code. · [employer] Access Code: [email or web address] Expires: 12/23/2018  8:45 AM 
 
4. Enter the last four digits of your Social Security Number (xxxx) and Date of Birth (mm/dd/yyyy) as indicated and click Submit. You will be taken to the next sign-up page. 5. Create a [employer] ID. This will be your [employer] login ID and cannot be changed, so think of one that is secure and easy to remember. 6. Create a [employer] password. You can change your password at any time. 7. Enter your Password Reset Question and Answer. This can be used at a later time if you forget your password. 8. Enter your e-mail address. You will receive e-mail notification when new information is available in [address] 9. Click Sign Up. You can now view and download portions of your medical record.  
10. Click the Download Summary menu link to download a portable copy of your medical information. If you have questions, please visit the Frequently Asked Questions section of the [employer] website. Remember, [employer] is NOT to be used for urgent needs. For medical emergencies, dial 911. Now available from your iPhone and Android! Please provide this summary of care documentation to your next provider. Your primary care clinician is listed as Kenya Lynn. If you have any questions after today's visit, please call [phone].

## 2018-09-24 NOTE — PROGRESS NOTES
HISTORY OF PRESENT ILLNESS  Makenna Ramos is a 72 y.o. female. HPI  Last here 9/20/17. Pt is here to f/u on chronic conditions. 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 Soham Burnette (uro-gyn) for leaky bladder and uterine prolapse  Pt had a bilateral oophorectomy and mesh placement 10/19/17  Surgery went well, no complications  Of note, pt had a hysterectomy in the 1970s - not on file    Pt saw Dr Melquiades Cid regarding possible hives in 8/18, was given prednisone which did not help  She then saw Dr Jimenez Remy (derm) in 9/18  Pt was put on zyrtec, which may have helped somewhat  However she is still having sx, states they come and go x 2 months  Sx appear mainly on L arm, but also sometimes on stomach, back, face  Pt does not think she has any new detergents, products, or foods  Discussed zantac could help  Next visit scheduled for 10/4/18    Pt wonders about [employer] for w/l  Discussed not knowing how effective this is     Pt states she has a lipoma on her back, wonders how she can get this removed  Provided referral for surg    Reviewed mammo 6/18: nl     ACP not on file. SDM is her  Darrell Perez).       PREVENTIVE:  Colonoscopy: 2/04/13, Dr. Isabell Sheridan, likely 10 year repeat, does not specify on report, advised to contact office  Pap: Martha Aceves, 4/17, hysterectomy in 1970s  Mammogram: 6/14/18, negative  Dexa: declines Tdap: declines  Pneumovax: 16  Dscfxxn76: 3/16/17  Zostavax: 10/26/12  Shingrix: 1st round completed   Flu shot: 18   Hep C Screen: 3/17, negative  A1C: 7/15 5.9,  5.8,  5.6, 3/17 5.6,  5.7  Eye exam: Dr. Blondie Koyanagi, , scheduled for   Lipids:    EK17, nsr    Patient Active Problem List    Diagnosis Date Noted    Pure hyperglyceridemia 2016    Prediabetes 2016    Breast cancer ([facility] [address] 2015    Personal history of colonic polyps 2013     Current Outpatient Prescriptions   Medication Sig Dispense Refill    predniSONE (STERAPRED DS) 10 mg dose pack See administration instruction per 10mg dose pack 21 Tab 0    atorvastatin (LIPITOR) 20 mg tablet TAKE 1 TABLET NIGHTLY 90 Tab 2    B.infantis-B.ani-B.long-B.bifi (PROBIOTIC 4X) 10-15 mg TbEC Take  by mouth daily.  loratadine (CLARITIN) 10 mg tablet Take 10 mg by mouth.  CALCIUM PO Take  by mouth.  acetaminophen (TYLENOL) 325 mg tablet Take 325-650 mg by mouth every four (4) hours as needed for Pain.  krill-omega-3-dha-epa-lipids (KRILL OIL) 870-11-82-56 mg cap Take 1 Cap by mouth daily.  Acai Berry Extract 500 mg cap Take 2 Caps by mouth daily.  multivitamin (ONE A DAY) tablet Take 1 Tab by mouth daily.          Past Surgical History:   Procedure Laterality Date    BREAST SURGERY PROCEDURE UNLISTED      right lumpectomy    HX BREAST BIOPSY Right     HX BREAST LUMPECTOMY Right     no radiation or chemo    HX GYN      hysterectomy 70's      Lab Results  Component Value Date/Time   WBC 5.4 2017 01:41 PM   HGB 13.2 2017 01:41 PM   HCT 39.5 2017 01:41 PM   PLATELET 685  01:41 PM   MCV 92 2017 01:41 PM     Lab Results  Component Value Date/Time   Cholesterol, total 210 (H) 2017 08:57 AM   HDL Cholesterol 54 2017 08:57 AM   LDL, calculated 121 (H) 2017 08:57 AM   Triglyceride 175 (H) 2017 08:57 AM     Lab Results  Component Value Date/Time   GFR est non-AA 87 09/20/2017 08:57 AM   GFR est  09/20/2017 08:57 AM   Creatinine 0.71 09/20/2017 08:57 AM   BUN 17 09/20/2017 08:57 AM   Sodium 140 09/20/2017 08:57 AM   Potassium 4.1 09/20/2017 08:57 AM   Chloride 101 09/20/2017 08:57 AM   CO2 24 09/20/2017 08:57 AM        Review of Systems   Respiratory: Negative for shortness of breath. Cardiovascular: Negative for chest pain. Musculoskeletal: Negative for falls. Skin: Positive for rash. Psychiatric/Behavioral: Negative for depression. Physical Exam   Constitutional: She is oriented to person, place, and time. She appears well-developed and well-nourished. No distress. HENT:   Head: Normocephalic and atraumatic. Mouth/Throat: Oropharynx is clear and moist. No oropharyngeal exudate. Eyes: Conjunctivae and EOM are normal. Right eye exhibits no discharge. Left eye exhibits no discharge. Neck: Normal range of motion. Neck supple. No carotid bruits    Cardiovascular: Normal rate, regular rhythm and normal heart sounds. Exam reveals no gallop and no friction rub. No murmur heard. Pulmonary/Chest: Effort normal and breath sounds normal. No respiratory distress. She has no wheezes. She has no rales. She exhibits no tenderness. Musculoskeletal: Normal range of motion. She exhibits no edema, tenderness or deformity. Lymphadenopathy:     She has no cervical adenopathy. Neurological: She is alert and oriented to person, place, and time. Coordination normal.   Skin: Skin is warm and dry. No rash noted. She is not diaphoretic. No erythema. No pallor. Scattered erythematous plaques on forearms  4 in diameter lipoma on back   Psychiatric: She has a normal mood and affect.  Her behavior is normal.       ASSESSMENT and PLAN    ICD-10-CM ICD-9-CM    1. Malignant neoplasm of right female breast, unspecified estrogen receptor status, unspecified site of breast ([facility] [address]    mammo UTD from 6/18, in remission, follows with Dr Darius Jansen annually in March   C50.911 174.9 LIPID PANEL      METABOLIC PANEL, COMPREHENSIVE      CBC W/O DIFF      TSH 3RD GENERATION      HEMOGLOBIN A1C WITH EAG      VITAMIN D, 25 HYDROXY   2. Pure hyperglyceridemia    Not at goal on lipitor 20mg last year, advised increasing to 40mg but she continued 20mg dose instead, will repeat blood work today, if still elevated will need to increase to 40mg   E78.1 272.1 LIPID PANEL      METABOLIC PANEL, COMPREHENSIVE      CBC W/O DIFF      TSH 3RD GENERATION      HEMOGLOBIN A1C WITH EAG      VITAMIN D, 25 HYDROXY   3. IFG (impaired fasting glucose)    Check a1c   R73.01 790.21 LIPID PANEL      METABOLIC PANEL, COMPREHENSIVE      CBC W/O DIFF      TSH 3RD GENERATION      HEMOGLOBIN A1C WITH EAG      VITAMIN D, 25 HYDROXY   4. Vitamin D deficiency    Takes OTC vit D, will check level and adjust dose prn   E55.9 268.9 LIPID PANEL      METABOLIC PANEL, COMPREHENSIVE      CBC W/O DIFF      TSH 3RD GENERATION      HEMOGLOBIN A1C WITH EAG      VITAMIN D, 25 HYDROXY          6. Medicare annual wellness visit, subsequent Z00.00 V70.0    7. Rash    Pt has had recurrent rash for last month and a half, she is currently seeing derm for this and has f/u next week    Will have her add zantac to the zyrtec to improve hives. R21 782.1    8. Lipoma of torso    Referred to Dr Mulugeta Santana for resection   D17.1 214.1 REFERRAL TO GENERAL SURGERY   Depression screen reviewed and negative. Scribed by Lloyd Castano of [address] [place] [address] [address], as dictated by Dr. Christi Degroot. Current diagnosis and concerns discussed with pt at length. Pt understands risks and benefits or current treatment plan and medications, and accepts the treatment and medication with any possible risks. Pt asks appropriate questions, which were answered. Pt was instructed to call with any concerns or problems. This note will not be viewable in [address]

## 2018-09-25 LAB
25(OH)D3+25(OH)D2 SERPL-MCNC: 40.5 NG/ML (ref 30–100)
ALBUMIN SERPL-MCNC: 4.5 G/DL (ref 3.5–4.8)
ALBUMIN/GLOB SERPL: 2 {RATIO} (ref 1.2–2.2)
ALP SERPL-CCNC: 80 IU/L (ref 39–117)
ALT SERPL-CCNC: 17 IU/L (ref 0–32)
AST SERPL-CCNC: 17 IU/L (ref 0–40)
BILIRUB SERPL-MCNC: 0.4 MG/DL (ref 0–1.2)
BUN SERPL-MCNC: 17 MG/DL (ref 8–27)
BUN/CREAT SERPL: 24 (ref 12–28)
CALCIUM SERPL-MCNC: 9.3 MG/DL (ref 8.7–10.3)
CHLORIDE SERPL-SCNC: 103 MMOL/L (ref 96–106)
CHOLEST SERPL-MCNC: 202 MG/DL (ref 100–199)
CO2 SERPL-SCNC: 24 MMOL/L (ref 20–29)
CREAT SERPL-MCNC: 0.72 MG/DL (ref 0.57–1)
ERYTHROCYTE [DISTWIDTH] IN BLOOD BY AUTOMATED COUNT: 13.5 % (ref 12.3–15.4)
EST. AVERAGE GLUCOSE BLD GHB EST-MCNC: 117 MG/DL
GLOBULIN SER CALC-MCNC: 2.3 G/DL (ref 1.5–4.5)
GLUCOSE SERPL-MCNC: 89 MG/DL (ref 65–99)
HBA1C MFR BLD: 5.7 % (ref 4.8–5.6)
HCT VFR BLD AUTO: 40 % (ref 34–46.6)
HDLC SERPL-MCNC: 51 MG/DL
HGB BLD-MCNC: 13.1 G/DL (ref 11.1–15.9)
LDLC SERPL CALC-MCNC: 117 MG/DL (ref 0–99)
MCH RBC QN AUTO: 30.1 PG (ref 26.6–33)
MCHC RBC AUTO-ENTMCNC: 32.8 G/DL (ref 31.5–35.7)
MCV RBC AUTO: 92 FL (ref 79–97)
PLATELET # BLD AUTO: 159 X10E3/UL (ref 150–379)
POTASSIUM SERPL-SCNC: 4 MMOL/L (ref 3.5–5.2)
PROT SERPL-MCNC: 6.8 G/DL (ref 6–8.5)
RBC # BLD AUTO: 4.35 X10E6/UL (ref 3.77–5.28)
SODIUM SERPL-SCNC: 142 MMOL/L (ref 134–144)
TRIGL SERPL-MCNC: 170 MG/DL (ref 0–149)
VLDLC SERPL CALC-MCNC: 34 MG/DL (ref 5–40)
WBC # BLD AUTO: 5.1 X10E3/UL (ref 3.4–10.8)

## 2018-10-26 ENCOUNTER — TELEPHONE (OUTPATIENT)
Dept: INTERNAL MEDICINE CLINIC | Age: 73
End: 2018-10-26

## 2018-10-26 NOTE — TELEPHONE ENCOUNTER
Pt requesting an appt to have stitches removed. I attempted to contact practice as I was not sure which appointment type to schedule this as, but I was not able to get through to practice. Patient best contact number is [phone].        Message received & copied from Rodolfo Carson Lyn

## 2018-11-06 ENCOUNTER — OFFICE VISIT (OUTPATIENT)
Dept: INTERNAL MEDICINE CLINIC | Age: 73
End: 2018-11-06

## 2018-11-06 VITALS
BODY MASS INDEX: 23.19 KG/M2 | OXYGEN SATURATION: 96 % | SYSTOLIC BLOOD PRESSURE: 110 MMHG | DIASTOLIC BLOOD PRESSURE: 61 MMHG | HEART RATE: 92 BPM | RESPIRATION RATE: 16 BRPM | HEIGHT: 62 IN | WEIGHT: 126 LBS | TEMPERATURE: 98 F

## 2018-11-06 DIAGNOSIS — E78.5 DYSLIPIDEMIA: ICD-10-CM

## 2018-11-06 DIAGNOSIS — L50.9 HIVES: Primary | ICD-10-CM

## 2018-11-06 DIAGNOSIS — R73.01 IFG (IMPAIRED FASTING GLUCOSE): ICD-10-CM

## 2018-11-06 NOTE — PROGRESS NOTES
HISTORY OF PRESENT ILLNESS  Arya Garrido is a 72 y.o. female. HPI  Last here 18. Pt is here for acute/routine care. 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 Candelaria Moran (uro-gyn) for leaky bladder and uterine prolapse  Pt had a bilateral oophorectomy and mesh placement 10/19/17  Surgery went well, no complications  Of note, pt had a hysterectomy in the 1970s - not on file     Lov, proved referral for surg for lipoma  Pt has appointment scheduled with Dr Lynne Villatoro not on file. SDM is her  Shade Hardin).       PREVENTIVE:  Colonoscopy: 13, Dr. Marissa Webb, likely 10 year repeat, does not specify on report, advised to contact office  Pap: Fahad Sommers, , hysterectomy in   Mammogram: 18, negative  Dexa: declines   Tdap: declines  Pneumovax: 16  [id number]: 3/16/17  Zostavax: 10/26/12  Shingrix: 1st round completed   Flu shot: 18   Hep C Screen: 3/17, negative  A1C: 7/15 5.9,  5.8,  5.6, 3/17 5.6,  5.7,  5.7  Eye exam: Dr. Chitra Mora, , scheduled for   Lipids:    EK17, nsr    Patient Active Problem List    Diagnosis Date Noted    Pure hyperglyceridemia 2016    Prediabetes 2016    [facility]    Personal history of colonic polyps 02/04/2013     Current Outpatient Medications   Medication Sig Dispense Refill    cetirizine (ZYRTEC) 10 mg tablet       hydrOXYzine HCl (ATARAX) 25 mg tablet       atorvastatin (LIPITOR) 20 mg tablet TAKE 1 TABLET NIGHTLY 90 Tab 2    B.infantis-B.ani-B.long-B.bifi (PROBIOTIC 4X) 10-15 mg TbEC Take  by mouth daily.  loratadine (CLARITIN) 10 mg tablet Take 10 mg by mouth.  CALCIUM PO Take  by mouth.  acetaminophen (TYLENOL) 325 mg tablet Take 325-650 mg by mouth every four (4) hours as needed for Pain.  krill-omega-3-dha-epa-lipids (KRILL OIL) 014-47-16-80 mg cap Take 1 Cap by mouth daily.  Acai Berry Extract 500 mg cap Take 2 Caps by mouth daily.  multivitamin (ONE A DAY) tablet Take 1 Tab by mouth daily. Past Surgical History:   Procedure Laterality Date    BREAST SURGERY PROCEDURE UNLISTED  2009    right lumpectomy    HX BREAST BIOPSY Right 2009    HX BREAST LUMPECTOMY Right 1920    no radiation or chemo    HX GYN      hysterectomy 70's      Lab Results   Component Value Date/Time    WBC 5.1 09/24/2018 09:00 AM    HGB 13.1 09/24/2018 09:00 AM    HCT 40.0 09/24/2018 09:00 AM    PLATELET 211 64/42/3129 09:00 AM    MCV 92 09/24/2018 09:00 AM     Lab Results   Component Value Date/Time    Cholesterol, total 202 (H) 09/24/2018 09:00 AM    HDL Cholesterol 51 09/24/2018 09:00 AM    LDL, calculated 117 (H) 09/24/2018 09:00 AM    Triglyceride 170 (H) 09/24/2018 09:00 AM     Lab Results   Component Value Date/Time    GFR est non-AA 85 09/24/2018 09:00 AM    GFR est AA 97 09/24/2018 09:00 AM    Creatinine 0.72 09/24/2018 09:00 AM    BUN 17 09/24/2018 09:00 AM    Sodium 142 09/24/2018 09:00 AM    Potassium 4.0 09/24/2018 09:00 AM    Chloride 103 09/24/2018 09:00 AM    CO2 24 09/24/2018 09:00 AM        Review of Systems   Respiratory: Negative for shortness of breath.     Cardiovascular: Negative for chest pain, palpitations, orthopnea, claudication, leg swelling and PND. Skin: Positive for itching and rash. Physical Exam   Constitutional: She is oriented to person, place, and time. She appears well-developed and well-nourished. No distress. HENT:   Head: Normocephalic and atraumatic. Mouth/Throat: Oropharynx is clear and moist. No oropharyngeal exudate. Eyes: Conjunctivae and EOM are normal. Right eye exhibits no discharge. Left eye exhibits no discharge. Neck: Normal range of motion. Neck supple. Cardiovascular: Normal rate, regular rhythm and normal heart sounds. Exam reveals no gallop and no friction rub. No murmur heard. Pulmonary/Chest: Effort normal and breath sounds normal. No respiratory distress. She has no wheezes. She has no rales. She exhibits no tenderness. Musculoskeletal: Normal range of motion. She exhibits no edema, tenderness or deformity. Incision r upper arm C/D/I  2 sutures   Lymphadenopathy:     She has no cervical adenopathy. Neurological: She is alert and oriented to person, place, and time. Coordination normal.   Skin: Skin is warm and dry. No rash noted. She is not diaphoretic. No erythema. No pallor. 4 in lipoma on back, soft   Psychiatric: She has a normal mood and affect. Her behavior is normal.       ASSESSMENT and PLAN    ICD-10-CM ICD-9-CM    1. Hives    Improving on zyrtec and atarax, seeing derm, had bx, removed sutures today, 2 sutures without complication   Y59.6 457.6    2. IFG (impaired fasting glucose)    a1c stable, mildly elevated, reviewed results with her   R73.01 790.21    3. Dyslipidemia    On lipitor, near goal, she is not interested in increasing lipitor further to get LDL <100, she will work more aggressively on diet   E78.5 272.4         Scribed by Myra Moncada of [address], as dictated by Dr. Kailee Holm. Current diagnosis and concerns discussed with pt at length.  Pt understands risks and benefits or current treatment plan and medications, and accepts the treatment and medication with any possible risks. Pt asks appropriate questions, which were answered. Pt was instructed to call with any concerns or problems. I have reviewed the note documented by the scribe. The services provided are my own. The documentation is accurate This note will not be viewable in [facility].

## 2018-11-08 ENCOUNTER — OFFICE VISIT (OUTPATIENT)
Dept: SURGERY | Age: 73
End: 2018-11-08

## 2018-11-08 ENCOUNTER — HOSPITAL ENCOUNTER (OUTPATIENT)
Dept: NON INVASIVE DIAGNOSTICS | Age: 73
Discharge: HOME | End: 2018-11-08
Payer: MEDICARE

## 2018-11-08 VITALS
RESPIRATION RATE: 16 BRPM | TEMPERATURE: 96.1 F | BODY MASS INDEX: 22.82 KG/M2 | HEIGHT: 62 IN | SYSTOLIC BLOOD PRESSURE: 106 MMHG | WEIGHT: 124 LBS | DIASTOLIC BLOOD PRESSURE: 67 MMHG | OXYGEN SATURATION: 95 % | HEART RATE: 94 BPM

## 2018-11-08 DIAGNOSIS — M79.89 SOFT TISSUE MASS: ICD-10-CM

## 2018-11-08 DIAGNOSIS — M79.89 SOFT TISSUE MASS: Primary | ICD-10-CM

## 2018-11-08 LAB
ATRIAL RATE: 85 BPM
CALCULATED P AXIS, ECG09: 47 DEGREES
CALCULATED R AXIS, ECG10: 13 DEGREES
CALCULATED T AXIS, ECG11: 17 DEGREES
DIAGNOSIS, 93000: NORMAL
P-R INTERVAL, ECG05: 196 MS
Q-T INTERVAL, ECG07: 352 MS
QRS DURATION, ECG06: 76 MS
QTC CALCULATION (BEZET), ECG08: 418 MS
VENTRICULAR RATE, ECG03: 85 BPM

## 2018-11-08 PROCEDURE — 93005 ELECTROCARDIOGRAM TRACING: CPT

## 2018-11-08 RX ORDER — ZOSTER VACCINE RECOMBINANT, ADJUVANTED 50 MCG/0.5
KIT INTRAMUSCULAR
COMMUNITY
Start: 2018-08-30 | End: 2019-01-07

## 2018-11-08 NOTE — H&P (VIEW-ONLY)
HISTORY OF PRESENT ILLNESS Glory Friday is a 72 y.o. female. Soft tissue mass of the back 5 years Getting bigger Discomfort, particularly when lying back No drainage No other masses 
 
 
____________________________________________________________________________ Patient presents with: 
Skin Problem: Seen at request of eddie Zamorano on back. /67 (BP 1 Location: Left arm, BP Patient Position: Sitting)   Pulse 94   Temp 96.1 °F (35.6 °C) (Oral)   Resp 16   Ht 5' 2\" (1.575 m)   Wt 56.2 kg (124 lb)   SpO2 95%   BMI 22.68 kg/m² Past Medical History: 
No date: Arthritis No date: [facility] Comment:  right lumpectomy No date: [facility] ([facility] Comment:  right breast cancer No date: Hypercholesterolemia No date: Other ill-defined conditions(799.89) Comment:  high cholesterol 2013: Personal history of colonic polyps Past Surgical History: 
: BREAST SURGERY PROCEDURE UNLISTED Comment:  right lumpectomy 2009: HX BREAST BIOPSY; Right 
1920: HX BREAST LUMPECTOMY; Right Comment:  no radiation or chemo No date: HX GYN Comment:  hysterectomy 70's 
10/19/2017: HX UROLOGICAL Comment:  bladder prolapse Social History Socioeconomic History Marital status:  Spouse name: Not on file Number of children: Not on file Years of education: Not on file Highest education level: Not on file Social Needs Financial resource strain: Not on file Food insecurity - worry: Not on file Food insecurity - inability: Not on file Transportation needs - medical: Not on file Transportation needs - non-medical: Not on file Occupational History Not on file Tobacco Use Smoking status: Former Smoker Packs/day: 2.00 Years: 10.00 Pack years: 21 Quit date: 1978 Years since quittin.8 Smokeless tobacco: Never Used Substance and Sexual Activity Alcohol use: Yes Comment: seldom Drug use: No 
    Sexual activity: Yes 
      Partners: Male Birth control/protection: None Other Topics Concerns: 
      Not on file Social History Narrative Not on file Review of patient's family history indicates: 
Problem: Heart Disease Relation: Mother Age of Onset: (Not Specified) Comment:  from [place] 
Problem: Diabetes Relation: Father Age of Onset: (Not Specified) Problem: Diabetes Relation: Sister Age of Onset: (Not Specified) Problem: Heart Disease Relation: Sister Age of Onset: (Not Specified) Comment: heart failure Problem: Diabetes Relation: Brother Age of Onset: (Not Specified) Problem: Liver Disease Relation: Sister Age of Onset: (Not Specified) Problem: Heart Disease Relation: Son Age of Onset: (Not Specified) Comment: Stent placed Current Outpatient Medications: 
cetirizine (ZYRTEC) 10 mg tablet,  
atorvastatin (LIPITOR) 20 mg tablet, TAKE 1 TABLET NIGHTLY B.infantis-B.ani-B.long-B.bifi (PROBIOTIC 4X) 10-15 mg TbEC, Take  by mouth daily. CALCIUM PO, Take 1 Tab by mouth two (2) times a day. acetaminophen (TYLENOL) 325 mg tablet, Take 325-650 mg by mouth every four (4) hours as needed for Pain. 
krill-omega-3-dha-epa-lipids (KRILL OIL) 724-52-97-50 mg cap, Take 1 Cap by mouth daily. Acai Berry Extract 500 mg cap, Take 2 Caps by mouth daily. multivitamin (ONE A DAY) tablet, Take 1 Tab by mouth daily. SHINGRIX, PF, 50 mcg/0.5 mL susr injection,  
hydrOXYzine HCl (ATARAX) 25 mg tablet, No current facility-administered medications for this visit. Allergies: No Known Allergies 
_____________________________________________________________________________ Skin Problem The history is provided by the patient. This is a chronic problem.  The current episode started more than 1 week ago. The problem has been gradually worsening. Pertinent negatives include no chest pain, no headaches and no shortness of breath. The symptoms are aggravated by bending and twisting. The treatment provided no relief. Review of Systems Constitutional: Negative for chills, fever and weight loss. HENT: Negative for ear pain. Eyes: Negative for pain. Respiratory: Negative for shortness of breath. Cardiovascular: Negative for chest pain. Gastrointestinal: Negative for blood in stool. Genitourinary: Negative for hematuria. Musculoskeletal: Negative for joint pain. Skin: Negative for rash. Neurological: Negative for dizziness, focal weakness, seizures and headaches. Endo/Heme/Allergies: Does not bruise/bleed easily. Psychiatric/Behavioral: The patient does not have insomnia. Physical Exam  
Constitutional: She is oriented to person, place, and time. She appears well-developed and well-nourished. No distress. HENT:  
Head: Normocephalic and atraumatic. Mouth/Throat: No oropharyngeal exudate. Eyes: Pupils are equal, round, and reactive to light. Neck: Normal range of motion. No tracheal deviation present. Cardiovascular: Normal rate, regular rhythm and normal heart sounds. No murmur heard. Pulmonary/Chest: Effort normal and breath sounds normal. No respiratory distress. She has no wheezes. Abdominal: Soft. Bowel sounds are normal. She exhibits no distension and no mass. There is no tenderness. There is no rebound and no guarding. Musculoskeletal: Normal range of motion. She exhibits no edema or tenderness. Lymphadenopathy:  
  She has no cervical adenopathy. Neurological: She is alert and oriented to person, place, and time. Skin: Skin is warm. No rash noted. She is not diaphoretic. No erythema. Psychiatric: She has a normal mood and affect. Her behavior is normal.  
 
 
ASSESSMENT and PLAN 
  ICD-10-CM ICD-9-CM 1. Soft tissue mass M79.9 729.90   
likely lipoma Symptomatic. I had an extensive discussion with Marry Tamayo regarding the risks, benefits, and alternatives of proceeding with excision of this soft tissue mass. Risks of surgery including the risk of anesthesia, bleeding, infection, injury to underlying structures, recurrence, need for further surgery, and the lack of symptomatic improvement were discussed and she is in agreement to proceed. Will schedule under sedation. Pt expressed understanding. Thank you for this consult.

## 2018-11-08 NOTE — LETTER
11/8/2018 10:48 AM 
 
Ms. Shreya Myers [facility] [address] [id number] Surgery Instruction Sheet You have been scheduled for surgery on 11/26/18 at 7:30am at [facility]. Please report to the Surgery Center at 6:15am, this is approximately 1 hours prior to your surgery time. The Surgery Center is located on the [address] [place] [address] side of the [facility] [place] [address]. You may or may not need to have a Pre-op Visit prior to your surgery. The Surgery Center nurse will call you directly and set this appointment with you. Bring a list of medications and your insurance cards with you. You may eat/drink prior to this visit. The Pre-op nurse will review your medical history, medications and give you additional instructions. They will also confirm your arrival time Call your physician immediately if you notice a change in your health between the time you saw your physician and the day of surgery If you take a blood thinner, please let us know. Call your ordering Doctor to make sure you can stop taking it prior to your surgery. STOP YOUR  ASPIRIN 5 DAYS PRIOR TO SURGERY. DO NOT TAKE IBUPROFEN, ADVIL, MOTRIN, ALEVE, EXCEDRIN, BC POWDER, GOODIES, FISH OIL OR ANY MEDICATION CONTAINING ASPIRIN 5 DAYS PRIOR TO YOUR SURGERY. MAY TAKE TYLENOL Eat a light dinner the evening before your surgery. DO NOT EAT OR DRINK ANYTHING AFTER MIDNIGHT THE NIGHT BEFORE YOUR SURGERY. This includes water, chewing gum, lifesavers, etc.  The Pre op nurse will check with you about any medication that you may need to take the morning of surgery. Shower with a new bar of Dial, Safeguard (antibacterial) soap or solution given to you by Preop, the night before surgery. Do not use lotion, powder or deodorant on the skin after showering.   Wear loose, comfortable clothing the day of surgery and bring a container to store your contacts, eyeglasses, dentures, hearing aid, etc.  Do not bring money, valuables, jewelry, etc. to the hospital.   
 
If you are having outpatient surgery, someone must come with you the morning of surgery to drive you home. You can not drive for 24 hours after any anesthesia. Sometimes it is necessary to stay overnight and leave the next morning. This is still considered outpatient for insurance deductibles. Someone will still need to drive you home. If you have questions or concerns, please feel free to call /Burt at [phone]. If you need to cancel your surgery, please call as soon as possible. Sincerely, Rosalinda Hooker MD

## 2018-11-08 NOTE — PROGRESS NOTES
HISTORY OF PRESENT ILLNESS  Jereld Mcardle is a 72 y.o. female. Soft tissue mass of the back  5 years  Getting bigger  Discomfort, particularly when lying back  No drainage  No other masses      ____________________________________________________________________________  Patient presents with:  Skin Problem: Seen at request of eddie Wilson on back. /67 (BP 1 Location: Left arm, BP Patient Position: Sitting)   Pulse 94   Temp 96.1 °F (35.6 °C) (Oral)   Resp 16   Ht 5' 2\" (1.575 m)   Wt 56.2 kg (124 lb)   SpO2 95%   BMI 22.68 kg/m²   Past Medical History:  No date: Arthritis  No date: Breast cancer ([facility]      Comment:  right lumpectomy  No date: Cancer [facility])      Comment:  right breast cancer  No date: Hypercholesterolemia  No date: Other ill-defined conditions(799.89)      Comment:  high cholesterol  2013: Personal history of colonic polyps  Past Surgical History:  : BREAST SURGERY PROCEDURE UNLISTED      Comment:  right lumpectomy  2009: HX BREAST BIOPSY;  Right  1920: HX BREAST LUMPECTOMY; Right      Comment:  no radiation or chemo  No date: HX GYN      Comment:  hysterectomy 70's  10/19/2017: HX UROLOGICAL      Comment:  bladder prolapse  Social History    Socioeconomic History      Marital status:       Spouse name: Not on file      Number of children: Not on file      Years of education: Not on file      Highest education level: Not on file    Social Needs      Financial resource strain: Not on file      Food insecurity - worry: Not on file      Food insecurity - inability: Not on file      Transportation needs - medical: Not on file      Transportation needs - non-medical: Not on file    Occupational History      Not on file    Tobacco Use      Smoking status: Former Smoker        Packs/day: 2.00        Years: 10.00        Pack years: 21        Quit date: 1978        Years since quittin.8      Smokeless tobacco: Never Used    Substance and Sexual Activity      Alcohol use: Yes        Comment: seldom      Drug use: No      Sexual activity: Yes        Partners: Male        Birth control/protection: None    Other Topics      Concerns:        Not on file    Social History Narrative      Not on file    Review of patient's family history indicates:  Problem: Heart Disease      Relation: Mother          Age of Onset: (Not Specified)          Comment:  from [place]  Problem: Diabetes      Relation: Father          Age of Onset: (Not Specified)  Problem: Diabetes      Relation: Sister          Age of Onset: (Not Specified)  Problem: Heart Disease      Relation: Sister          Age of Onset: (Not Specified)          Comment: heart failure  Problem: Diabetes      Relation: Brother          Age of Onset: (Not Specified)  Problem: Liver Disease      Relation: Sister          Age of Onset: (Not Specified)  Problem: Heart Disease      Relation: Son          Age of Onset: (Not Specified)          Comment: Stent placed    Current Outpatient Medications:  cetirizine (ZYRTEC) 10 mg tablet,   atorvastatin (LIPITOR) 20 mg tablet, TAKE 1 TABLET NIGHTLY  B.infantis-B.ani-B.long-B.bifi (PROBIOTIC 4X) 10-15 mg TbEC, Take  by mouth daily. CALCIUM PO, Take 1 Tab by mouth two (2) times a day. acetaminophen (TYLENOL) 325 mg tablet, Take 325-650 mg by mouth every four (4) hours as needed for Pain.  krill-omega-3-dha-epa-lipids (KRILL OIL) 461-07-69-50 mg cap, Take 1 Cap by mouth daily. Acai Berry Extract 500 mg cap, Take 2 Caps by mouth daily. multivitamin (ONE A DAY) tablet, Take 1 Tab by mouth daily. SHINGRIX, PF, 50 mcg/0.5 mL susr injection,   hydrOXYzine HCl (ATARAX) 25 mg tablet,     No current facility-administered medications for this visit. Allergies: No Known Allergies  _____________________________________________________________________________        Skin Problem   The history is provided by the patient. This is a chronic problem.  The current episode started more than 1 week ago. The problem has been gradually worsening. Pertinent negatives include no chest pain, no headaches and no shortness of breath. The symptoms are aggravated by bending and twisting. The treatment provided no relief. Review of Systems   Constitutional: Negative for chills, fever and weight loss. HENT: Negative for ear pain. Eyes: Negative for pain. Respiratory: Negative for shortness of breath. Cardiovascular: Negative for chest pain. Gastrointestinal: Negative for blood in stool. Genitourinary: Negative for hematuria. Musculoskeletal: Negative for joint pain. Skin: Negative for rash. Neurological: Negative for dizziness, focal weakness, seizures and headaches. Endo/Heme/Allergies: Does not bruise/bleed easily. Psychiatric/Behavioral: The patient does not have insomnia. Physical Exam   Constitutional: She is oriented to person, place, and time. She appears well-developed and well-nourished. No distress. HENT:   Head: Normocephalic and atraumatic. Mouth/Throat: No oropharyngeal exudate. Eyes: Pupils are equal, round, and reactive to light. Neck: Normal range of motion. No tracheal deviation present. Cardiovascular: Normal rate, regular rhythm and normal heart sounds. No murmur heard. Pulmonary/Chest: Effort normal and breath sounds normal. No respiratory distress. She has no wheezes. Abdominal: Soft. Bowel sounds are normal. She exhibits no distension and no mass. There is no tenderness. There is no rebound and no guarding. Musculoskeletal: Normal range of motion. She exhibits no edema or tenderness. Lymphadenopathy:     She has no cervical adenopathy. Neurological: She is alert and oriented to person, place, and time. Skin: Skin is warm. No rash noted. She is not diaphoretic. No erythema. Psychiatric: She has a normal mood and affect. Her behavior is normal.       ASSESSMENT and PLAN    ICD-10-CM ICD-9-CM    1.  Soft tissue mass M79.9 729.90    likely lipoma  Symptomatic. I had an extensive discussion with Todd William regarding the risks, benefits, and alternatives of proceeding with excision of this soft tissue mass. Risks of surgery including the risk of anesthesia, bleeding, infection, injury to underlying structures, recurrence, need for further surgery, and the lack of symptomatic improvement were discussed and she is in agreement to proceed. Will schedule under sedation. Pt expressed understanding. Thank you for this consult.

## 2018-11-08 NOTE — PROGRESS NOTES
Chief Complaint   Patient presents with    Skin Problem     Seen at request of Dr. Bobby Sylvester, eddie lucas on torso. 1. Have you been to the ER, urgent care clinic since your last visit? Hospitalized since your last visit? No    2. Have you seen or consulted any other health care providers outside of the [address] [place] [address] since your last visit? Include any pap smears or colon screening.  No

## 2018-11-19 NOTE — PERIOP NOTES
[facility] Ambulatory Surgery Unit Pre-operative Instructions Surgery/Procedure Date  11/26/18         Tentative Arrival Time 5088 1. On the day of your surgery/procedure, please report to the Ambulatory Surgery Unit Registration Desk and sign in at your designated time. The Ambulatory Surgery Unit is located in [facility] on the [facility] side of the [facility] across from the [address] [place] [address]. Please have all of your health insurance cards and a photo ID. 2. You must have someone with you to drive you home, as you should not drive a car for 24 hours following anesthesia. Please make arrangements for a responsible adult friend or family member to stay with you for at least the first 24 hours after your surgery. 3. Do not have anything to eat or drink (including water, gum, mints, coffee, juice) after 11:59 PM  11/25/18. This may not apply to medications prescribed by your physician. (Please note below the special instructions with medications to take the morning of surgery, if applicable.) 4. We recommend you do not drink any alcoholic beverages for 24 hours before and after your surgery. 5. Contact your surgeons office for instructions on the following medications: non-steroidal anti-inflammatory drugs (i.e. Advil, Aleve), vitamins, and supplements. (Some surgeons will want you to stop these medications prior to surgery and others may allow you to take them) **If you are currently taking Plavix, Coumadin, Aspirin and/or other blood-thinning agents, contact your surgeon for instructions. ** Your surgeon will partner with the physician prescribing these medications to determine if it is safe to stop or if you need to continue taking. Please do not stop taking these medications without instructions from your surgeon.  
 
6. In an effort to help prevent surgical site infection, we ask that you shower with an anti-bacterial soap (i.e. Dial/Safeguard, or the soap provided to you at your preadmission testing appointment) for 3 days prior to and on the morning of surgery, using a fresh towel after each shower. (Please begin this process with fresh bed linens.) Do not apply any lotions, powders, or deodorants after the shower on the day of your procedure. If applicable, please do not shave the operative site for 48 hours prior to surgery. 7. Wear comfortable clothes. Wear glasses instead of contacts. Do not bring any jewelry or money (other than copays or fees as instructed). Do not wear make-up, particularly mascara, the morning of your surgery. Do not wear nail polish, particularly if you are having foot /hand surgery. Wear your hair loose or down, no ponytails, buns, amie pins or clips. All body piercings must be removed. 8. You should understand that if you do not follow these instructions your surgery may be cancelled. If your physical condition changes (i.e. fever, cold or flu) please contact your surgeon as soon as possible. 9. It is important that you be on time. If a situation occurs where you may be late, or if you have any questions or problems, please call [phone]. 
 
10. Your surgery time may be subject to change. You will receive a phone call the day prior to surgery to confirm your arrival time. 11. Pediatric patients: please bring a change of clothes, diapers, bottle/sippy cup, pacifier, etc. 
 
 
Special Instructions: Take all medications and inhalers, as prescribed, on the morning of surgery with a sip of water EXCEPT: n/a I understand a pre-operative phone call will be made to verify my surgery time. In the event that I am not available, I give permission for a message to be left on my answering service and/or with another person? yes 
 
 
 ___________________      ___________________      ________________ 
(Signature of Patient)          (Witness)                   (Date and Time) Preop instructions reviewed  Pt verbalized understanding.

## 2018-11-21 ENCOUNTER — ANESTHESIA EVENT (OUTPATIENT)
Dept: SURGERY | Age: 73
End: 2018-11-21
Payer: MEDICARE

## 2018-11-26 ENCOUNTER — ANESTHESIA (OUTPATIENT)
Dept: SURGERY | Age: 73
End: 2018-11-26
Payer: MEDICARE

## 2018-11-26 ENCOUNTER — HOSPITAL ENCOUNTER (OUTPATIENT)
Age: 73
Setting detail: OUTPATIENT SURGERY
Discharge: HOME | End: 2018-11-26
Attending: SURGERY | Admitting: SURGERY
Payer: MEDICARE

## 2018-11-26 VITALS
TEMPERATURE: 98 F | OXYGEN SATURATION: 94 % | DIASTOLIC BLOOD PRESSURE: 63 MMHG | RESPIRATION RATE: 15 BRPM | BODY MASS INDEX: 23.19 KG/M2 | WEIGHT: 126 LBS | SYSTOLIC BLOOD PRESSURE: 115 MMHG | HEART RATE: 71 BPM | HEIGHT: 62 IN

## 2018-11-26 DIAGNOSIS — R52 PAIN: Primary | ICD-10-CM

## 2018-11-26 PROCEDURE — 77030018836 HC SOL IRR NACL ICUM -A: Performed by: SURGERY

## 2018-11-26 PROCEDURE — 76060000061 HC AMB SURG ANES 0.5 TO 1 HR: Performed by: SURGERY

## 2018-11-26 PROCEDURE — 74011250636 HC RX REV CODE- 250/636

## 2018-11-26 PROCEDURE — 74011250636 HC RX REV CODE- 250/636: Performed by: SURGERY

## 2018-11-26 PROCEDURE — 76210000040 HC AMBSU PH I REC FIRST 0.5 HR: Performed by: SURGERY

## 2018-11-26 PROCEDURE — 77030020255 HC SOL INJ LR 1000ML BG: Performed by: SURGERY

## 2018-11-26 PROCEDURE — 77030039266 HC ADH SKN EXOFIN S2SG -A: Performed by: SURGERY

## 2018-11-26 PROCEDURE — 76210000046 HC AMBSU PH II REC FIRST 0.5 HR: Performed by: SURGERY

## 2018-11-26 PROCEDURE — 88304 TISSUE EXAM BY PATHOLOGIST: CPT

## 2018-11-26 PROCEDURE — 77030011640 HC PAD GRND REM COVD -A: Performed by: SURGERY

## 2018-11-26 PROCEDURE — 77030003029 HC SUT VCRL J&J -B: Performed by: SURGERY

## 2018-11-26 PROCEDURE — 76030000000 HC AMB SURG OR TIME 0.5 TO 1: Performed by: SURGERY

## 2018-11-26 PROCEDURE — 77030021352 HC CBL LD SYS DISP COVD -B: Performed by: SURGERY

## 2018-11-26 PROCEDURE — 74011000250 HC RX REV CODE- 250: Performed by: SURGERY

## 2018-11-26 PROCEDURE — 77030002933 HC SUT MCRYL J&J -A: Performed by: SURGERY

## 2018-11-26 PROCEDURE — 77030011265 HC ELECTRD BLD HEX COVD -A: Performed by: SURGERY

## 2018-11-26 RX ORDER — SODIUM CHLORIDE, SODIUM LACTATE, POTASSIUM CHLORIDE, CALCIUM CHLORIDE 600; 310; 30; 20 MG/100ML; MG/100ML; MG/100ML; MG/100ML
25 INJECTION, SOLUTION INTRAVENOUS CONTINUOUS
Status: DISCONTINUED | OUTPATIENT
Start: 2018-11-26 | End: 2018-11-26 | Stop reason: HOSPADM

## 2018-11-26 RX ORDER — IBUPROFEN 600 MG/1
600 TABLET ORAL
Qty: 25 TAB | Refills: 1 | Status: SHIPPED | OUTPATIENT
Start: 2018-11-26 | End: 2018-12-01

## 2018-11-26 RX ORDER — ONDANSETRON 2 MG/ML
4 INJECTION INTRAMUSCULAR; INTRAVENOUS AS NEEDED
Status: DISCONTINUED | OUTPATIENT
Start: 2018-11-26 | End: 2018-11-26 | Stop reason: HOSPADM

## 2018-11-26 RX ORDER — CEFAZOLIN SODIUM/WATER 2 G/20 ML
2 SYRINGE (ML) INTRAVENOUS ONCE
Status: COMPLETED | OUTPATIENT
Start: 2018-11-26 | End: 2018-11-26

## 2018-11-26 RX ORDER — FENTANYL CITRATE 50 UG/ML
25 INJECTION, SOLUTION INTRAMUSCULAR; INTRAVENOUS
Status: DISCONTINUED | OUTPATIENT
Start: 2018-11-26 | End: 2018-11-26 | Stop reason: HOSPADM

## 2018-11-26 RX ORDER — CEFAZOLIN SODIUM/WATER 2 G/20 ML
SYRINGE (ML) INTRAVENOUS
Status: DISCONTINUED
Start: 2018-11-26 | End: 2018-11-26 | Stop reason: HOSPADM

## 2018-11-26 RX ORDER — ACETAMINOPHEN 500 MG
1000 TABLET ORAL 4 TIMES DAILY
Status: SHIPPED | COMMUNITY
Start: 2018-11-26 | End: 2019-09-25

## 2018-11-26 RX ORDER — PROPOFOL 10 MG/ML
INJECTION, EMULSION INTRAVENOUS
Status: DISCONTINUED | OUTPATIENT
Start: 2018-11-26 | End: 2018-11-26 | Stop reason: HOSPADM

## 2018-11-26 RX ORDER — DIPHENHYDRAMINE HYDROCHLORIDE 50 MG/ML
12.5 INJECTION, SOLUTION INTRAMUSCULAR; INTRAVENOUS AS NEEDED
Status: DISCONTINUED | OUTPATIENT
Start: 2018-11-26 | End: 2018-11-26 | Stop reason: HOSPADM

## 2018-11-26 RX ORDER — HYDROMORPHONE HYDROCHLORIDE 1 MG/ML
.2-.5 INJECTION, SOLUTION INTRAMUSCULAR; INTRAVENOUS; SUBCUTANEOUS
Status: DISCONTINUED | OUTPATIENT
Start: 2018-11-26 | End: 2018-11-26 | Stop reason: HOSPADM

## 2018-11-26 RX ORDER — FENTANYL CITRATE 50 UG/ML
INJECTION, SOLUTION INTRAMUSCULAR; INTRAVENOUS AS NEEDED
Status: DISCONTINUED | OUTPATIENT
Start: 2018-11-26 | End: 2018-11-26 | Stop reason: HOSPADM

## 2018-11-26 RX ORDER — MORPHINE SULFATE 10 MG/ML
2 INJECTION, SOLUTION INTRAMUSCULAR; INTRAVENOUS
Status: DISCONTINUED | OUTPATIENT
Start: 2018-11-26 | End: 2018-11-26 | Stop reason: HOSPADM

## 2018-11-26 RX ORDER — SODIUM CHLORIDE 0.9 % (FLUSH) 0.9 %
5-10 SYRINGE (ML) INJECTION AS NEEDED
Status: DISCONTINUED | OUTPATIENT
Start: 2018-11-26 | End: 2018-11-26 | Stop reason: HOSPADM

## 2018-11-26 RX ORDER — SODIUM CHLORIDE 0.9 % (FLUSH) 0.9 %
5-10 SYRINGE (ML) INJECTION EVERY 8 HOURS
Status: DISCONTINUED | OUTPATIENT
Start: 2018-11-26 | End: 2018-11-26 | Stop reason: HOSPADM

## 2018-11-26 RX ORDER — OXYCODONE HYDROCHLORIDE 5 MG/1
5 TABLET ORAL
Qty: 15 TAB | Refills: 0 | Status: SHIPPED | OUTPATIENT
Start: 2018-11-26 | End: 2018-12-13 | Stop reason: ALTCHOICE

## 2018-11-26 RX ORDER — BUPIVACAINE HYDROCHLORIDE AND EPINEPHRINE 5; 5 MG/ML; UG/ML
INJECTION, SOLUTION EPIDURAL; INTRACAUDAL; PERINEURAL AS NEEDED
Status: DISCONTINUED | OUTPATIENT
Start: 2018-11-26 | End: 2018-11-26 | Stop reason: HOSPADM

## 2018-11-26 RX ORDER — PHENYLEPHRINE HCL IN 0.9% NACL 0.4MG/10ML
SYRINGE (ML) INTRAVENOUS AS NEEDED
Status: DISCONTINUED | OUTPATIENT
Start: 2018-11-26 | End: 2018-11-26 | Stop reason: HOSPADM

## 2018-11-26 RX ADMIN — Medication 80 MCG: at 07:58

## 2018-11-26 RX ADMIN — PROPOFOL 100 MCG/KG/MIN: 10 INJECTION, EMULSION INTRAVENOUS at 07:33

## 2018-11-26 RX ADMIN — Medication 2 G: at 07:38

## 2018-11-26 RX ADMIN — FENTANYL CITRATE 50 MCG: 50 INJECTION, SOLUTION INTRAMUSCULAR; INTRAVENOUS at 07:35

## 2018-11-26 RX ADMIN — SODIUM CHLORIDE, SODIUM LACTATE, POTASSIUM CHLORIDE, AND CALCIUM CHLORIDE 25 ML/HR: 600; 310; 30; 20 INJECTION, SOLUTION INTRAVENOUS at 07:08

## 2018-11-26 RX ADMIN — Medication 80 MCG: at 07:45

## 2018-11-26 RX ADMIN — Medication 80 MCG: at 07:41

## 2018-11-26 RX ADMIN — FENTANYL CITRATE 50 MCG: 50 INJECTION, SOLUTION INTRAMUSCULAR; INTRAVENOUS at 07:30

## 2018-11-26 RX ADMIN — Medication 80 MCG: at 07:49

## 2018-11-26 NOTE — DISCHARGE INSTRUCTIONS
Dr. Gibson Form Discharge Instructions for:  Umm Lester    MRN: [id number] : 1945    Admitted: 2018  Discharged: 2018       What to do at [address]: Resume your normal diet    Recommended activity: as tolerated    Follow-up with Dr. Dawson Crawford in 1-2 weeks. Call [phone] for an appointment. Wound Care:  · Replace outer gauze with new dry gauze daily starting today to protect the \"glue\". · See additional instructions below: How to Care for Your Wound After Its Treated With DERMABOND* Topical Skin Adhesive    DERMABOND* Topical Skin Adhesive (2-octyl cyanoacrylate) is a sterile, liquid skin adhesivethat holds wound edges together. The film will usually remain in place for 5 to 10 days, thennaturally fall off your skin. The following will answer some of your questions and provide instructions for proper care for yourwound while it is healing:    CHECK WOUND APPEARANCE   Some swelling, redness, and pain are common with all wounds and normally will go away as the wound heals. If swelling, redness, or pain increases or if the wound feels warm to the touch, contact a doctor. Also contact a doctor if the wound edges reopen or separate. REPLACE BANDAGES   If your wound is bandaged, keep the bandage dry.  Replace the dressing daily until the adhesive film has fallen off or if the bandage should become wet, unless otherwise instructed by your physician.  When changing the dressing, do not place tape directly over the DERMABOND adhesive film, because removing the tape later may also remove the film. AVOID TOPICAL MEDICATIONS   Do not apply liquid or ointment medications or any other product to your wound while the DERMABOND adhesive film is in place. These may loosen the film before your wound is healed. KEEP WOUND DRY AND PROTECTED   You may occasionally and briefly wet your wound in the shower or bath.  Do not soak or scrub your wound, do not swim, and avoid periods of heavy perspiration until the DERMABOND adhesive has naturally fallen off. After showering or bathing, gently blot your wound dry with a soft towel. If a protective dressing is being used, apply a fresh, dry bandage, being sure to keep the tape off the DERMABOND adhesive film.  Apply a clean, dry bandage over the wound if necessary to protect it.  Protect your wound from injury until the skin has had sufficient time to heal.   Do not scratch, rub, or pick at the DERMABOND adhesive film. This may loosen the film before your wound is healed.  Protect the wound from prolonged exposure to sunlight or tanning lamps while the film is in place. DO NOT TAKE TYLENOL/ACETAMINOPHEN WITH PERCOCET, [employer], [address] TAKE NARCOTIC PAIN MEDICATIONS WITH FOOD     Narcotics tend to be constipating, we suggest taking a stool softener such as Colace or Miralax (follow package instructions). DO NOT DRIVE WHILE TAKING NARCOTIC PAIN MEDICATIONS. DO NOT TAKE SLEEPING MEDICATIONS OR ANTIANXIETY MEDICATIONS WHILE TAKING NARCOTIC PAIN MEDICATIONS,  ESPECIALLY THE NIGHT OF ANESTHESIA! CPAP PATIENTS BE SURE TO WEAR MACHINE WHENEVER NAPPING OR SLEEPING! DISCHARGE SUMMARY from Nurse    The following personal items collected during your admission are returned to you:   Dental Appliance: Dental Appliances: Partials, Uppers, Lowers  Vision: Visual Aid: Glasses(placed in patient's belonging bag)  Hearing Aid:    Jewelry: Jewelry: None  Clothing: Clothing: Other (comment)(belonging bag)  Other Valuables: Other Valuables: None  Valuables sent to safe:        PATIENT INSTRUCTIONS:    After General Anesthesia or Intravenous Sedation, for 24 hours or while taking prescription Narcotics:        Someone should be with you for the next 24 hours. For your own safety, a responsible adult must drive you home. · Limit your activities  · Recommended activity: Rest today, up with assistance today.  Do not climb stairs or shower unattended for the next 24 hours. · Please start with a soft bland diet and advance as tolerated (no nausea) to regular diet. · If you have a sore throat you should try the following: fluids, warm salt water gargles, or throat lozenges. If it does not improve after several days please follow up with your primary physician. · Do not drive and operate hazardous machinery  · Do not make important personal or business decisions  · Do  not drink alcoholic beverages  · If you have not urinated within 8 hours after discharge, please contact your surgeon on call. Report the following to your surgeon:  · Excessive pain, swelling, redness or odor of or around the surgical area  · Temperature over 100.5  · Nausea and vomiting lasting longer than 4 hours or if unable to take medications  · Any signs of decreased circulation or nerve impairment to extremity: change in color, persistent  numbness, tingling, coldness or increase pain      · You will receive a Post Operative Call from one of the Recovery Room Nurses on the day after your surgery to check on you. It is very important for us to know how you are recovering after your surgery. If you have an issue or need to speak with someone, please call your surgeon, do not wait for the post operative call. · You may receive an e-mail or letter in the mail from Rachel regarding your experience with us in the Ambulatory Surgery Unit. Your feedback is valuable to us and we appreciate your participation in the survey. · If the above instructions are not adequate or you are having problems after your surgery, call the physician at their office number. · We wish you a speedy recovery ? What to do at Home:      *  Please give a list of your current medications to your Primary Care Provider.     *  Please update this list whenever your medications are discontinued, doses are      changed, or new medications (including over-the-counter products) are added.    *  Please carry medication information at all times in case of emergency situations. These are general instructions for a healthy lifestyle:    No smoking/ No tobacco products/ Avoid exposure to second hand smoke    Surgeon General's Warning:  Quitting smoking now greatly reduces serious risk to your health. Obesity, smoking, and sedentary lifestyle greatly increases your risk for illness    A healthy diet, regular physical exercise & weight monitoring are important for maintaining a healthy lifestyle    You may be retaining fluid if you have a history of heart failure or if you experience any of the following symptoms:  Weight gain of 3 pounds or more overnight or 5 pounds in a week, increased swelling in our hands or feet or shortness of breath while lying flat in bed. Please call your doctor as soon as you notice any of these symptoms; do not wait until your next office visit. Recognize signs and symptoms of STROKE:    B - Balance  E - Eyes    F-  Face looks uneven  A-  Arms unable to move or move even  S-  Speech slurred or non-existent  T-  Time-call 911 as soon as signs and symptoms begin-DO NOT go       Back to bed or wait to see if you get better-TIME IS BRAIN. If you have not received your influenza and/or pneumococcal vaccine, please follow up with your primary care physician. The discharge information has been reviewed with the patient and caregiver. The patient and caregiver verbalized understanding.

## 2018-11-26 NOTE — INTERVAL H&P NOTE
H&P Update: 
Aman Shea was seen and examined. Site marked. History and physical has been reviewed. The patient has been examined.  There have been no significant clinical changes since the completion of the originally dated History and Physical.

## 2018-11-26 NOTE — PERIOP NOTES
Patient resting comfortably, additional warm blankets provided per patient request. Dr. Shira Livingston at bedside.

## 2018-11-26 NOTE — ANESTHESIA PREPROCEDURE EVALUATION
Anesthetic History No history of anesthetic complications Review of Systems / Medical History Patient summary reviewed, nursing notes reviewed and pertinent labs reviewed Pulmonary Within defined limits Neuro/Psych Within defined limits Cardiovascular Within defined limits Exercise tolerance: >4 METS 
  
GI/Hepatic/Renal 
Within defined limits Endo/Other Arthritis and cancer (breast, right) Other Findings Comments: Mass left back Physical Exam 
 
Airway Mallampati: I 
TM Distance: < 4 cm Neck ROM: decreased range of motion Mouth opening: Normal 
 
 Cardiovascular Rhythm: regular Rate: normal 
 
 
 
 Dental 
 
Dentition: Upper partial plate and Lower partial plate Pulmonary Breath sounds clear to auscultation Abdominal 
GI exam deferred Other Findings Anesthetic Plan ASA: 2 Anesthesia type: MAC Induction: Intravenous Anesthetic plan and risks discussed with: Patient

## 2018-11-26 NOTE — PERIOP NOTES
Permission received to review discharge instructions and discuss private health information with  Mike Ao.

## 2018-11-26 NOTE — PERIOP NOTES
Belinda Kim 1945 
[id number] Situation: 
Verbal report given from: Haley Ochoa RN and Radha EUBANKS Procedure: Procedure(s): EXCISION SOFT TISSUE MASS OF BACK Background: 
 
Preoperative diagnosis: SOFT TISSUE MASS OF BACK Postoperative diagnosis: * No post-op diagnosis entered * :  Dr. Fani Vazquez Assistant(s): Circ-1: Riya Roe RN 
Circ-Relief: Franck Banda RN Scrub Tech-1: Lige [facility]urg Asst-1: Joanna Every Specimens:  
ID Type Source Tests Collected by Time Destination 1 : Soft tissue mass of the back Preservative Back  Missael Wing MD 11/26/2018 8833 Pathology Assessment: 
Intra-procedure medications Anesthesia gave intra-procedure sedation and medications, see anesthesia flow sheet Intravenous fluids: Daily Isidro Vital signs stable Recommendation: 
 
Permission to share finding with  Maria Luz Grim : yes

## 2018-11-26 NOTE — OP NOTES
OPERATIVE NOTE  11/26/2018    Preperative Diagnosis: SOFT TISSUE MASS OF BACK  Post-operative Diagnosis: SOFT TISSUE MASS OF BACK    Procedure: Procedure(s):  EXCISION SOFT TISSUE MASS OF BACK 8 cm x 6 cm x 3 cm    Surgeon: Jacobo Castellanos MD    Anesthesia: MAC + local  Estimated Blood Loss: Minimal  Specimens:   ID Type Source Tests Collected by Time Destination   1 : Soft tissue mass of the back Preservative Back  Missael Wing MD 11/26/2018 7652 Pathology      Findings: Fatty soft tissue mass consistent with a lipoma  Complications: None  Implants: None      Belinda Kim is a 72 y.o. female who has been brought to the operating room for procedure as above. The risks, benefits, and alternatives were explained and consent was obtained for the procedure. Procedure was confirmed and site marked with the patient. After IV sedation the patient was kept in the right side down position. The area was prepped and draped in the usual manner. Local anesthetic was infiltrated into the skin and soft tissue surrounding the mass. An incision was made. The mass extended through the superficial fascia, sitting on the paraspinous muscle fascia, but was not involving the deep fascia or muscle. The mass was fatty, consistent with a lipoma. It was dissected free of surrounding tissues and excised in its entirety and send for pathology. Hemostasis was noted. The superficial fascia was closed with interrupted 3-0 Vicryl; followed by closure of the skin with 4-0 Monocryl. The wound was covered with Dermabond. She remained stable during my presence in the operating room.

## 2018-11-26 NOTE — ANESTHESIA POSTPROCEDURE EVALUATION
Procedure(s): EXCISION SOFT TISSUE MASS OF BACK. Anesthesia Post Evaluation Multimodal analgesia: multimodal analgesia used between 6 hours prior to anesthesia start to PACU discharge Patient location during evaluation: bedside Patient participation: complete - patient participated Level of consciousness: awake Pain score: 0 Airway patency: patent Anesthetic complications: no 
Cardiovascular status: hemodynamically stable Respiratory status: room air and spontaneous ventilation Hydration status: acceptable Post anesthesia nausea and vomiting:  none Visit Vitals /63 Pulse 71 Temp 36.7 °C (98 °F) Resp 15 Ht 5' 2\" (1.575 m) Wt 57.2 kg (126 lb) SpO2 94% BMI 23.05 kg/m²

## 2018-11-26 NOTE — PERIOP NOTES
HoB elevated, sipping ginger ale. Denies discomfort.  Giovanni Bartlett to bedside. 0840 Discharged to home via/wc,accompanied to car per RN. Skin warm and dry, awake and alert. Respirations even, unlabored. Pt and family members questions and concerns addressed prior to discharge. All belongings (clothing, glasses) with pt.

## 2018-11-29 RX ORDER — ATORVASTATIN CALCIUM 20 MG/1
TABLET, FILM COATED ORAL
Qty: 90 TAB | Refills: 2 | Status: SHIPPED | OUTPATIENT
Start: 2018-11-29 | End: 2019-08-15 | Stop reason: SDUPTHER

## 2018-12-13 ENCOUNTER — OFFICE VISIT (OUTPATIENT)
Dept: SURGERY | Age: 73
End: 2018-12-13

## 2018-12-13 VITALS
SYSTOLIC BLOOD PRESSURE: 119 MMHG | TEMPERATURE: 98.6 F | WEIGHT: 126 LBS | DIASTOLIC BLOOD PRESSURE: 74 MMHG | OXYGEN SATURATION: 94 % | HEIGHT: 62 IN | HEART RATE: 88 BPM | RESPIRATION RATE: 16 BRPM | BODY MASS INDEX: 23.19 KG/M2

## 2018-12-13 DIAGNOSIS — Z09 POSTOPERATIVE EXAMINATION: Primary | ICD-10-CM

## 2018-12-13 NOTE — PROGRESS NOTES
Chief Complaint   Patient presents with    Post OP Follow Up     Excision soft tissue mass of back     Path: lipoma    Tolerating PO  Pain controlled  Taking no pain meds  complete resolution of preoperative symptoms    Physical Exam:    Wound: clean, dry, no drainage    Doing well  Continue unrestricted activity.   Follow-up: ubaldo Dillon MD FACS

## 2019-01-07 ENCOUNTER — TELEPHONE (OUTPATIENT)
Dept: INTERNAL MEDICINE CLINIC | Age: 74
End: 2019-01-07

## 2019-01-07 ENCOUNTER — HOSPITAL ENCOUNTER (OUTPATIENT)
Dept: LAB | Age: 74
Discharge: HOME | End: 2019-01-07
Payer: MEDICARE

## 2019-01-07 ENCOUNTER — OFFICE VISIT (OUTPATIENT)
Dept: INTERNAL MEDICINE CLINIC | Age: 74
End: 2019-01-07

## 2019-01-07 VITALS
RESPIRATION RATE: 16 BRPM | OXYGEN SATURATION: 99 % | TEMPERATURE: 97.4 F | HEART RATE: 90 BPM | SYSTOLIC BLOOD PRESSURE: 122 MMHG | DIASTOLIC BLOOD PRESSURE: 75 MMHG | BODY MASS INDEX: 23.19 KG/M2 | HEIGHT: 62 IN | WEIGHT: 126 LBS

## 2019-01-07 DIAGNOSIS — R53.83 MALAISE AND FATIGUE: ICD-10-CM

## 2019-01-07 DIAGNOSIS — R53.81 MALAISE AND FATIGUE: ICD-10-CM

## 2019-01-07 DIAGNOSIS — R19.7 DIARRHEA, UNSPECIFIED TYPE: Primary | ICD-10-CM

## 2019-01-07 DIAGNOSIS — J40 BRONCHITIS: ICD-10-CM

## 2019-01-07 PROCEDURE — 85027 COMPLETE CBC AUTOMATED: CPT

## 2019-01-07 PROCEDURE — 80053 COMPREHEN METABOLIC PANEL: CPT

## 2019-01-07 PROCEDURE — 36415 COLL VENOUS BLD VENIPUNCTURE: CPT

## 2019-01-07 NOTE — TELEPHONE ENCOUNTER
Called, spoke to pt. Two pt identifiers confirmed. Pt offered and accepted appt for 1-7-19 at 1445. Pt verbalized understanding of information discussed w/ no further questions at this time.

## 2019-01-07 NOTE — PROGRESS NOTES
HISTORY OF PRESENT ILLNESS  Arnold Mccullough is a 72 y.o. female. HPI  Last here 11/6/18. Pt is here for acute care. Pt has been feeling well since around 12/20/18 which is when she went to [facility]  She was having coughing at that time, was given amoxicillin which she finished a few days ago  Pt was also given some cough medicine which she has not been taking much  Pt still has some coughing, though it is much improved  She is also having loose brown stools (not watery) since the trip home from [facility]  She has 2-3 episodes of this per day  She has had chills but no fever  Her head also feels \"off\" and she has been feeling weak  Denies sore throat, ear pain  Pt has not taken anything for her sx  Pt has been taking a probiotic  Will check labs  Advised BRAT diet  Ordered stool studies that she should complete in a week if not improving    Patient Active Problem List    Diagnosis Date Noted    Pure hyperglyceridemia 02/03/2016    Prediabetes 02/03/2016    [facility]    Personal history of colonic polyps 02/04/2013     Current Outpatient Medications   Medication Sig Dispense Refill    atorvastatin (LIPITOR) 20 mg tablet TAKE 1 TABLET NIGHTLY 90 Tab 2    acetaminophen (TYLENOL EXTRA STRENGTH) 500 mg tablet Take 2 Tabs by mouth four (4) times daily.  SHINGRIX, PF, 50 mcg/0.5 mL susr injection       cetirizine (ZYRTEC) 10 mg tablet       B.infantis-B.ani-B.long-B.bifi (PROBIOTIC 4X) 10-15 mg TbEC Take  by mouth daily.  CALCIUM PO Take 1 Tab by mouth two (2) times a day.  acetaminophen (TYLENOL) 325 mg tablet Take 325-650 mg by mouth every four (4) hours as needed for Pain.  krill-omega-3-dha-epa-lipids (KRILL OIL) 206-54-13-56 mg cap Take 1 Cap by mouth daily.  Acai Berry Extract 500 mg cap Take 2 Caps by mouth daily.  multivitamin (ONE A DAY) tablet Take 1 Tab by mouth daily.          Past Surgical History:   Procedure Laterality Date    BREAST SURGERY PROCEDURE UNLISTED  2009    right lumpectomy    HX BREAST BIOPSY Right 2009    HX BREAST LUMPECTOMY Right 1920    no radiation or chemo    HX GYN      hysterectomy 70's    HX PREMALIG/BENIGN SKIN LESION EXCISION      Excison soft tissue mass of back- Generia MD Sera    HX UROLOGICAL  10/19/2017    bladder prolapse      Lab Results   Component Value Date/Time    WBC 5.1 09/24/2018 09:00 AM    HGB 13.1 09/24/2018 09:00 AM    HCT 40.0 09/24/2018 09:00 AM    PLATELET 740 87/85/0501 09:00 AM    MCV 92 09/24/2018 09:00 AM     Lab Results   Component Value Date/Time    Cholesterol, total 202 (H) 09/24/2018 09:00 AM    HDL Cholesterol 51 09/24/2018 09:00 AM    LDL, calculated 117 (H) 09/24/2018 09:00 AM    Triglyceride 170 (H) 09/24/2018 09:00 AM     Lab Results   Component Value Date/Time    GFR est non-AA 85 09/24/2018 09:00 AM    GFR est AA 97 09/24/2018 09:00 AM    Creatinine 0.72 09/24/2018 09:00 AM    BUN 17 09/24/2018 09:00 AM    Sodium 142 09/24/2018 09:00 AM    Potassium 4.0 09/24/2018 09:00 AM    Chloride 103 09/24/2018 09:00 AM    CO2 24 09/24/2018 09:00 AM        Review of Systems   Constitutional: Positive for chills. Negative for fever. HENT: Negative for ear pain and sore throat. Respiratory: Positive for cough. Negative for shortness of breath. Cardiovascular: Negative for chest pain. Gastrointestinal: Positive for diarrhea. Physical Exam   Constitutional: She is oriented to person, place, and time. She appears well-developed and well-nourished. No distress. HENT:   Head: Normocephalic and atraumatic. Right Ear: External ear normal.   Left Ear: External ear normal.   Mouth/Throat: Oropharynx is clear and moist. No oropharyngeal exudate. Eyes: Conjunctivae and EOM are normal. Right eye exhibits no discharge. Left eye exhibits no discharge. Neck: Normal range of motion. Neck supple. Cardiovascular: Normal rate, regular rhythm and normal heart sounds.  Exam reveals no gallop and no friction rub. No murmur heard. Pulmonary/Chest: Effort normal and breath sounds normal. No respiratory distress. She has no wheezes. She has no rales. She exhibits no tenderness. Abdominal: Soft. She exhibits no distension and no mass. There is no tenderness. There is no rebound and no guarding. Musculoskeletal: Normal range of motion. She exhibits no edema, tenderness or deformity. Lymphadenopathy:     She has no cervical adenopathy. Neurological: She is alert and oriented to person, place, and time. Coordination normal.   Skin: Skin is warm and dry. No rash noted. She is not diaphoretic. No erythema. No pallor. Psychiatric: She has a normal mood and affect. Her behavior is normal.       ASSESSMENT and PLAN    ICD-10-CM ICD-9-CM    1. Diarrhea, unspecified type    Pt with mild loose stools likely fom amoxicillin, I suspect this will improve over next few days, continue probiotic and BRAT diet, good hydration, discussed stool sample if not improved in 1 week   R61.2 000.32 METABOLIC PANEL, COMPREHENSIVE      CBC W/O DIFF      CULTURE, STOOL      C DIFFICILE TOXIN A & B BY EIA      OVA+PARASITE + GIARDIA   2. Bronchitis    Improved, already completed amoxicillin, discussed zyrtec and flonase OTC   A64 563 METABOLIC PANEL, COMPREHENSIVE      CBC W/O DIFF   3. Malaise and fatigue--check labs G61.58 426.73 METABOLIC PANEL, COMPREHENSIVE    R53.83  CBC W/O DIFF        Scribed by [facility], as dictated by Dr. Gillian Hernandez. Current diagnosis and concerns discussed with pt at length. Pt understands risks and benefits or current treatment plan and medications, and accepts the treatment and medication with any possible risks. Pt asks appropriate questions, which were answered. Pt was instructed to call with any concerns or problems. I have reviewed the note documented by the scribe. The services provided are my own.   The documentation is accurate

## 2019-01-07 NOTE — TELEPHONE ENCOUNTER
#[phone] pt states she was sick in [facility] and is home and still sick. Pt needs to be seen. The Musinex is not helping with the cough or feeling bad.

## 2019-01-07 NOTE — PATIENT INSTRUCTIONS
Zyrtec and flonase over the counter    BRAT diet for diarrhea--bananas, rice, apples, toast    Continue probiotic

## 2019-01-08 LAB
ALBUMIN SERPL-MCNC: 4 G/DL (ref 3.5–4.8)
ALBUMIN/GLOB SERPL: 1.3 {RATIO} (ref 1.2–2.2)
ALP SERPL-CCNC: 106 IU/L (ref 39–117)
ALT SERPL-CCNC: 29 IU/L (ref 0–32)
AST SERPL-CCNC: 25 IU/L (ref 0–40)
BILIRUB SERPL-MCNC: 0.3 MG/DL (ref 0–1.2)
BUN SERPL-MCNC: 17 MG/DL (ref 8–27)
BUN/CREAT SERPL: 24 (ref 12–28)
CALCIUM SERPL-MCNC: 9.2 MG/DL (ref 8.7–10.3)
CHLORIDE SERPL-SCNC: 105 MMOL/L (ref 96–106)
CO2 SERPL-SCNC: 21 MMOL/L (ref 20–29)
CREAT SERPL-MCNC: 0.7 MG/DL (ref 0.57–1)
ERYTHROCYTE [DISTWIDTH] IN BLOOD BY AUTOMATED COUNT: 14.1 % (ref 12.3–15.4)
GLOBULIN SER CALC-MCNC: 3 G/DL (ref 1.5–4.5)
GLUCOSE SERPL-MCNC: 111 MG/DL (ref 65–99)
HCT VFR BLD AUTO: 35.6 % (ref 34–46.6)
HGB BLD-MCNC: 12 G/DL (ref 11.1–15.9)
MCH RBC QN AUTO: 29.6 PG (ref 26.6–33)
MCHC RBC AUTO-ENTMCNC: 33.7 G/DL (ref 31.5–35.7)
MCV RBC AUTO: 88 FL (ref 79–97)
PLATELET # BLD AUTO: 363 X10E3/UL (ref 150–379)
POTASSIUM SERPL-SCNC: 4.2 MMOL/L (ref 3.5–5.2)
PROT SERPL-MCNC: 7 G/DL (ref 6–8.5)
RBC # BLD AUTO: 4.06 X10E6/UL (ref 3.77–5.28)
SODIUM SERPL-SCNC: 142 MMOL/L (ref 134–144)
WBC # BLD AUTO: 6.3 X10E3/UL (ref 3.4–10.8)

## 2019-06-20 ENCOUNTER — HOSPITAL ENCOUNTER (OUTPATIENT)
Dept: MAMMOGRAPHY | Age: 74
Discharge: HOME | End: 2019-06-20
Attending: INTERNAL MEDICINE
Payer: MEDICARE

## 2019-06-20 DIAGNOSIS — Z12.39 BREAST SCREENING, UNSPECIFIED: ICD-10-CM

## 2019-06-20 PROCEDURE — 77067 SCR MAMMO BI INCL CAD: CPT

## 2019-06-21 NOTE — PROGRESS NOTES
Spoke with patient using two identifiers. Patient was notified of her recent mammogram.  Patient verbalized understanding.

## 2019-06-25 ENCOUNTER — HOSPITAL ENCOUNTER (OUTPATIENT)
Dept: MAMMOGRAPHY | Age: 74
Discharge: HOME | End: 2019-06-25
Attending: INTERNAL MEDICINE
Payer: MEDICARE

## 2019-06-25 DIAGNOSIS — R92.8 ABNORMAL MAMMOGRAM: ICD-10-CM

## 2019-06-25 PROCEDURE — 77065 DX MAMMO INCL CAD UNI: CPT

## 2019-08-15 RX ORDER — ATORVASTATIN CALCIUM 20 MG/1
TABLET, FILM COATED ORAL
Qty: 90 TAB | Refills: 2 | Status: SHIPPED | OUTPATIENT
Start: 2019-08-15 | End: 2020-05-11

## 2019-09-24 ENCOUNTER — DOCUMENTATION ONLY (OUTPATIENT)
Dept: INTERNAL MEDICINE CLINIC | Age: 74
End: 2019-09-24

## 2019-09-24 NOTE — PROGRESS NOTES
Medicare Part B Preventive Services Limitations Recommendation Scheduled   Bone Mass Measurement  (age 72 & older, biennial) Requires diagnosis related to osteoporosis or estrogen deficiency. Biennial benefit unless patient has history of long-term glucocorticoid tx or baseline is needed because initial test was by other method Sometime in 2014      Recommended every 2 years Declines further   Cardiovascular Screening Blood Tests (every 5 years)  Total cholesterol, HDL, Triglycerides Order as a panel if possible Completed 9/2018      Recommended every year Due now   Colorectal Cancer Screening  -Fecal occult blood test (annual)  -Flexible sigmoidoscopy (5y)  -Screening colonoscopy (10y)  -Barium Enema    Completed in 2/2013 with Dr. Angelika Burk      Repeat in 5-10 years  Due after 2018      Call dr Lorena Delgado office to find out when to repeat this   Counseling to Prevent Tobacco Use (up to 8 sessions per year)  - Counseling greater than 3 and up to 10 minutes  - Counseling greater than 10 minutes Patients must be asymptomatic of tobacco-related conditions to receive as preventive service Quit in the 1970s N/A   Diabetes Screening Tests (at least every 3 years, Medicare covers annually or at 6-month intervals for prediabetic patients)      Fasting blood sugar (FBS) or glucose tolerance test (GTT) Patient must be diagnosed with one of the following:  -Hypertension, Dyslipidemia, obesity, previous impaired FBS or GTT  Or any two of the following: overweight, FH of diabetes, age ? 72, history of gestational diabetes, birth of baby weighing more than 9 pounds Completed 9/2018 with A1C 5.7      Recommended every 3-6 months for pre-diabetics  Due now   Diabetes Self-Management Training (DSMT) (no USPSTF recommendation) Requires referral by treating physician for patient with diabetes or renal disease. 10 hours of initial DSMT session of no less than 30 minutes each in a continuous 12-month period.  2 hours of follow-up DSMT in subsequent years. N/A N/A   Glaucoma Screening (no USPSTF recommendation) Diabetes mellitus, family history, , age 48 or over,  American, age 72 or over Completed 12/2017      Recommended annually Due now    Human Immunodeficiency Virus (HIV) Screening (annually for increased risk patients)  HIV-1 and HIV-2 by EIA, KEVIN, rapid antibody test, or oral mucosa transudate Patient must be at increased risk for HIV infection per USPSTF guidelines or pregnant. Tests covered annually for patients at increased risk. Pregnant patients may receive up to 3 test during pregnancy. N/A N/A   Medical Nutrition Therapy (MNT) (for diabetes or renal disease not recommended schedule) Requires referral by treating physician for patient with diabetes or renal disease. Can be provided in same year as diabetes self-management training (DSMT), and CMS recommends medical nutrition therapy take place after DSMT. Up to 3 hours for initial year and 2 hours in subsequent years. N/A N/A             Seasonal Influenza Vaccination (annually)    Completed 9/18      Recommended every year Due now   Pneumococcal Vaccination (once after 65)    [id number] - 9/20/16      [id number] - 3/16/17      Both recommended once over the age of 72 Completed          Completed   Hepatitis B Vaccinations (if medium/high risk) Medium/high risk factors: End-stage renal disease,  Hemophiliacs who received Factor VIII or IX concentrates, Clients of institutions for the mentally retarded, Persons who live in the same house as a HepB virus carrier, Homosexual men, Illicit injectable drug abusers. N/A N/A   Screening Mammography (biennial age 54-69)?  Annually (age 36 or over) Completed 6/2019      Recommended every year Due 6/2020   Screening Pap Tests and Pelvic Examination (up to age 79 and after 79 if unknown history or abnormal study last 10 years) Every 24 months except high risk Completed 4/2017      Recommended every 2 years Due now Ultrasound Screening for Abdominal Aortic Aneurysm (AAA) (once) Patient must be referred through [facility] and not have had a screening for abdominal aortic aneurysm before under Medicare.  Limited to patients who meet one of the following criteria:  - Men who are 73-68 years old and have smoked more than 100 cigarettes in their lifetime.  -Anyone with a FH of AAA  -Anyone recommended for screening by USPSTF N/A N/A

## 2019-09-24 NOTE — PROGRESS NOTES
HISTORY OF PRESENT ILLNESS  Xavier Thompson is a 69 y.o. female. HPI   Last here 1/7/19 Pt is here for routine care.       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 Lani Stephenson (uro-gyn) for leaky bladder and uterine prolapse  Pt had a bilateral oophorectomy and mesh placement 10/19/17  Surgery went well, no complications  Of note, pt had a hysterectomy in the 1970s - not on file     Previously provided referral for surg for lipoma   Pt had this lipoma excised 11/26/18 with Dr Kristy Smallwood     Pt follows with Dr Niki Pena (derm)  Last visit was 10/18  Pt had a bx done 10/26/18    Pt is currently taking krill oil     Continues on vit D + ca       Pt is on zyrtec qAM and hydroxyzine nightly    Reviewed mammo 6/19: slight abnormalities, repeat in 6 mos   Pt is concerned about the radiation   Discussed radiation in mammo is slight  Will repeat R side mammo in 12/19        Pt lives at home with her , this is a safe environment       Pt is functionally independent       No safety equipment     No memory concerns          ACP not on file.  SDM is her  Chester Lopes).   Provided information today.       PREVENTIVE:  Colonoscopy: 2/04/13, Dr. Elizabeth Rudolph, likely 10 year repeat, does not specify on report, advised to contact office  Pap: Veronica Beltran, 4/17, hysterectomy in 1970s, due   Mammogram: 6/14/19, slight abnormalities, repeat in 6 mos   Dexa: declines   Tdap: declines  Pneumovax: 16  [id number]: 3/16/17  Zostavax: 10/26/12  Shingrix: 1st round completed , 2nd round completed   Flu shot: 19   Hep C Screen: 3/17, negative  A1C: 7/15 5.9,  5.8,  5.6, 3/17 5.6,  5.7,  5.7  Eye exam: Dr. Carol Caballero,   Lipids:    EK17, nsr    Patient Active Problem List    Diagnosis Date Noted    Pure hyperglyceridemia 2016    Prediabetes 2016    Breast cancer ([facility] [address] 2015    Personal history of colonic polyps 2013     Current Outpatient Medications   Medication Sig Dispense Refill    atorvastatin (LIPITOR) 20 mg tablet TAKE 1 TABLET NIGHTLY 90 Tab 2    acetaminophen (TYLENOL EXTRA STRENGTH) 500 mg tablet Take 2 Tabs by mouth four (4) times daily.  cetirizine (ZYRTEC) 10 mg tablet       B.infantis-B.ani-B.long-B.bifi (PROBIOTIC 4X) 10-15 mg TbEC Take  by mouth daily.  CALCIUM PO Take 1 Tab by mouth two (2) times a day.  acetaminophen (TYLENOL) 325 mg tablet Take 325-650 mg by mouth every four (4) hours as needed for Pain.  krill-omega-3-dha-epa-lipids (KRILL OIL) 773-66-80-66 mg cap Take 1 Cap by mouth daily.  Acai Berry Extract 500 mg cap Take 2 Caps by mouth daily.  multivitamin (ONE A DAY) tablet Take 1 Tab by mouth daily.          Past Surgical History:   Procedure Laterality Date    BREAST SURGERY PROCEDURE UNLISTED      right lumpectomy    HX BREAST BIOPSY Right     HX BREAST LUMPECTOMY Right 2006    no radiation or chemo    HX GYN      hysterectomy 70's    HX PREMALIG/BENIGN SKIN LESION EXCISION      Excison soft tissue mass of back- Kaveh Lucas MD    HX UROLOGICAL  10/19/2017    bladder prolapse      Lab Results   Component Value Date/Time    WBC 6.3 2019 03:10 PM    HGB 12.0 2019 03:10 PM    HCT 35.6 2019 03:10 PM    PLATELET 128  03:10 PM    MCV 88 2019 03:10 PM     Lab Results   Component Value Date/Time    Cholesterol, total 202 (H) 09/24/2018 09:00 AM    HDL Cholesterol 51 09/24/2018 09:00 AM    LDL, calculated 117 (H) 09/24/2018 09:00 AM    Triglyceride 170 (H) 09/24/2018 09:00 AM     Lab Results   Component Value Date/Time    GFR est non-AA 87 01/07/2019 03:10 PM    GFR est  01/07/2019 03:10 PM    Creatinine 0.70 01/07/2019 03:10 PM    BUN 17 01/07/2019 03:10 PM    Sodium 142 01/07/2019 03:10 PM    Potassium 4.2 01/07/2019 03:10 PM    Chloride 105 01/07/2019 03:10 PM    CO2 21 01/07/2019 03:10 PM        Review of Systems   Respiratory: Negative for shortness of breath. Cardiovascular: Negative for chest pain. Physical Exam   Constitutional: She is oriented to person, place, and time. She appears well-developed and well-nourished. No distress. HENT:   Head: Normocephalic and atraumatic. Right Ear: External ear normal.   Left Ear: External ear normal.   Mouth/Throat: Oropharynx is clear and moist. No oropharyngeal exudate. Eyes: Conjunctivae and EOM are normal. Left eye exhibits no discharge. Neck: Normal range of motion. Neck supple. No carotid bruits    Cardiovascular: Normal rate, regular rhythm, normal heart sounds and intact distal pulses. Exam reveals no gallop and no friction rub. No murmur heard. Pulmonary/Chest: Effort normal and breath sounds normal. No respiratory distress. She has no wheezes. She has no rales. She exhibits no tenderness. Abdominal: Soft. She exhibits no distension and no mass. There is no tenderness. There is no rebound and no guarding. Musculoskeletal: Normal range of motion. She exhibits no edema, tenderness or deformity. Lymphadenopathy:     She has no cervical adenopathy. Neurological: She is alert and oriented to person, place, and time. Coordination normal.   Skin: Skin is warm and dry. No rash noted. She is not diaphoretic. No erythema. No pallor.    Psychiatric: She has a normal mood and affect. Her behavior is normal.       ASSESSMENT and PLAN    ICD-10-CM ICD-9-CM    1. Mixed hyperlipidemia                  Borderline Controlled on lipitor in the past repeat lipids today and adjust dose as needed E78.2 272.2    2. Malignant neoplasm of right female breast, unspecified estrogen receptor status, unspecified site of breast [facility])              Follows with dr Ronn Camilo annually in march mammo utd from June pt needs short term follow up on mammo in december  C50.911 174.9    3. IFG (impaired fasting glucose)              Check a1c  R73.01 790.21       4. Wt loss- wt slightly down from last year pt reports stable wt at home nl appetite, will keep an eye on this and keep cancer screening utd       Scribed by Ria Farr of [address] [place] [address] [address], as dictated by Dr. Mickey Peterson. Current diagnosis and concerns discussed with pt at length. Pt understands risks and benefits or current treatment plan and medications, and accepts the treatment and medication with any possible risks. Pt asks appropriate questions, which were answered. Pt was instructed to call with any concerns or problems. I have reviewed the note documented by the scribe. The services provided are my own.   The documentation is accurate

## 2019-09-25 ENCOUNTER — HOSPITAL ENCOUNTER (OUTPATIENT)
Dept: LAB | Age: 74
Discharge: HOME | End: 2019-09-25
Payer: MEDICARE

## 2019-09-25 ENCOUNTER — OFFICE VISIT (OUTPATIENT)
Dept: INTERNAL MEDICINE CLINIC | Age: 74
End: 2019-09-25

## 2019-09-25 VITALS
DIASTOLIC BLOOD PRESSURE: 70 MMHG | HEART RATE: 78 BPM | RESPIRATION RATE: 16 BRPM | WEIGHT: 119 LBS | BODY MASS INDEX: 21.9 KG/M2 | SYSTOLIC BLOOD PRESSURE: 112 MMHG | HEIGHT: 62 IN | TEMPERATURE: 97.6 F | OXYGEN SATURATION: 98 %

## 2019-09-25 DIAGNOSIS — Z23 ENCOUNTER FOR IMMUNIZATION: Primary | ICD-10-CM

## 2019-09-25 DIAGNOSIS — C50.911 MALIGNANT NEOPLASM OF RIGHT FEMALE BREAST, UNSPECIFIED ESTROGEN RECEPTOR STATUS, UNSPECIFIED SITE OF BREAST (HCC): ICD-10-CM

## 2019-09-25 DIAGNOSIS — R73.01 IFG (IMPAIRED FASTING GLUCOSE): ICD-10-CM

## 2019-09-25 DIAGNOSIS — E78.2 MIXED HYPERLIPIDEMIA: ICD-10-CM

## 2019-09-25 DIAGNOSIS — Z00.00 MEDICARE ANNUAL WELLNESS VISIT, SUBSEQUENT: ICD-10-CM

## 2019-09-25 PROCEDURE — 80061 LIPID PANEL: CPT

## 2019-09-25 PROCEDURE — 83036 HEMOGLOBIN GLYCOSYLATED A1C: CPT

## 2019-09-25 PROCEDURE — 80053 COMPREHEN METABOLIC PANEL: CPT

## 2019-09-25 PROCEDURE — 84443 ASSAY THYROID STIM HORMONE: CPT

## 2019-09-25 PROCEDURE — 36415 COLL VENOUS BLD VENIPUNCTURE: CPT

## 2019-09-25 PROCEDURE — 85027 COMPLETE CBC AUTOMATED: CPT

## 2019-09-25 NOTE — PATIENT INSTRUCTIONS
Medicare Wellness Visit, Female     The best way to live healthy is to have a lifestyle where you eat a well-balanced diet, exercise regularly, limit alcohol use, and quit all forms of tobacco/nicotine, if applicable. Regular preventive services are another way to keep healthy. Preventive services (vaccines, screening tests, monitoring & exams) can help personalize your care plan, which helps you manage your own care. Screening tests can find health problems at the earliest stages, when they are easiest to treat. [facility] follows the current, evidence-based guidelines published by the [facility] ([facility]) when recommending preventive services for our patients. Because we follow these guidelines, sometimes recommendations change over time as research supports it. (For example, mammograms used to be recommended annually. Even though Medicare will still pay for an annual mammogram, the newer guidelines recommend a mammogram every two years for women of average risk.)  Of course, you and your doctor may decide to screen more often for some diseases, based on your risk and your health status. Preventive services for you include:  - Medicare offers their members a free annual wellness visit, which is time for you and your primary care provider to discuss and plan for your preventive service needs. Take advantage of this benefit every year!  -All adults over the age of 72 should receive the recommended pneumonia vaccines. Current USPSTF guidelines recommend a series of two vaccines for the best pneumonia protection.   -All adults should have a flu vaccine yearly and a tetanus vaccine every 10 years. All adults age 61 and older should receive a shingles vaccine once in their lifetime.    -A bone mass density test is recommended when a woman turns 65 to screen for osteoporosis. This test is only recommended one time, as a screening.  Some providers will use this same test as a disease monitoring tool if you already have osteoporosis. -All adults age 38-68 who are overweight should have a diabetes screening test once every three years.   -Other screening tests and preventive services for persons with diabetes include: an eye exam to screen for diabetic retinopathy, a kidney function test, a foot exam, and stricter control over your cholesterol.   -Cardiovascular screening for adults with routine risk involves an electrocardiogram (ECG) at intervals determined by your doctor.   -Colorectal cancer screenings should be done for adults age 54-65 with no increased risk factors for colorectal cancer. There are a number of acceptable methods of screening for this type of cancer. Each test has its own benefits and drawbacks. Discuss with your doctor what is most appropriate for you during your annual wellness visit. The different tests include: colonoscopy (considered the best screening method), a fecal occult blood test, a fecal DNA test, and sigmoidoscopy. -Breast cancer screenings are recommended every other year for women of normal risk, age 54-69.  -Cervical cancer screenings for women over age 72 are only recommended with certain risk factors.   -All adults born between [facility] should be screened once for Hepatitis C. Here is a list of your current Health Maintenance items (your personalized list of preventive services) with a due date:  Health Maintenance Due   Topic Date Due    Glaucoma Screening   12/09/2017    Shingles Vaccine (2 of 2) 11/12/2018    Flu Vaccine  08/01/2019    Annual Well Visit  09/25/2019         Medicare Part B Preventive Services Limitations Recommendation Scheduled   Bone Mass Measurement  (age 72 & older, biennial) Requires diagnosis related to osteoporosis or estrogen deficiency.  Biennial benefit unless patient has history of long-term glucocorticoid tx or baseline is needed because initial test was by other method Sometime in 2014      Recommended every 2 years Declines further   Cardiovascular Screening Blood Tests (every 5 years)  Total cholesterol, HDL, Triglycerides Order as a panel if possible Completed 9/2018      Recommended every year Due now   Colorectal Cancer Screening  -Fecal occult blood test (annual)  -Flexible sigmoidoscopy (5y)  -Screening colonoscopy (10y)  -Barium Enema    Completed in 2/2013 with Dr. Bette Plunkett      Repeat in 5-10 years  Due after 2018      Call dr Dumont Cassette office to find out when to repeat this   Counseling to Prevent Tobacco Use (up to 8 sessions per year)  - Counseling greater than 3 and up to 10 minutes  - Counseling greater than 10 minutes Patients must be asymptomatic of tobacco-related conditions to receive as preventive service Quit in the 1970s N/A   Diabetes Screening Tests (at least every 3 years, Medicare covers annually or at 6-month intervals for prediabetic patients)      Fasting blood sugar (FBS) or glucose tolerance test (GTT) Patient must be diagnosed with one of the following:  -Hypertension, Dyslipidemia, obesity, previous impaired FBS or GTT  Or any two of the following: overweight, FH of diabetes, age ? 72, history of gestational diabetes, birth of baby weighing more than 9 pounds Completed 9/2018 with A1C 5.7      Recommended every 3-6 months for pre-diabetics  Due now   Diabetes Self-Management Training (DSMT) (no USPSTF recommendation) Requires referral by treating physician for patient with diabetes or renal disease. 10 hours of initial DSMT session of no less than 30 minutes each in a continuous 12-month period. 2 hours of follow-up DSMT in subsequent years.  N/A N/A   Glaucoma Screening (no USPSTF recommendation) Diabetes mellitus, family history, , age 48 or over,  American, age 72 or over Completed 11/18      Recommended annually Due annually     Human Immunodeficiency Virus (HIV) Screening (annually for increased risk patients)  HIV-1 and HIV-2 by EIA, KEVIN, rapid antibody test, or oral mucosa transudate Patient must be at increased risk for HIV infection per USPSTF guidelines or pregnant. Tests covered annually for patients at increased risk. Pregnant patients may receive up to 3 test during pregnancy. N/A N/A   Medical Nutrition Therapy (MNT) (for diabetes or renal disease not recommended schedule) Requires referral by treating physician for patient with diabetes or renal disease. Can be provided in same year as diabetes self-management training (DSMT), and CMS recommends medical nutrition therapy take place after DSMT. Up to 3 hours for initial year and 2 hours in subsequent years. N/A N/A             Seasonal Influenza Vaccination (annually)    Completed 9/19      Recommended every year Due annually    Pneumococcal Vaccination (once after 65)    [id number] - 9/20/16      [id number] - 3/16/17      Both recommended once over the age of 72 Completed          Completed   Hepatitis B Vaccinations (if medium/high risk) Medium/high risk factors: End-stage renal disease,  Hemophiliacs who received Factor VIII or IX concentrates, Clients of institutions for the mentally retarded, Persons who live in the same house as a HepB virus carrier, Homosexual men, Illicit injectable drug abusers. N/A N/A   Screening Mammography (biennial age 54-69)? Annually (age 36 or over) Completed 6/2019      Recommended every year Due 6/2020   Screening Pap Tests and Pelvic Examination (up to age 79 and after 79 if unknown history or abnormal study last 10 years) Every 25 months except high risk Completed 4/2017      Recommended every 2 years Due now   Ultrasound Screening for Abdominal Aortic Aneurysm (AAA) (once) Patient must be referred through [facility] and not have had a screening for abdominal aortic aneurysm before under Medicare.  Limited to patients who meet one of the following criteria:  - Men who are 73-68 years old and have smoked more than 100 cigarettes in their lifetime.  -Anyone with a FH of AAA  -Anyone recommended for screening by USPSTF N/A N/A         Please bring in a copy of your advanced directive to your next office visit so we can have a copy on file.

## 2019-09-26 LAB
ALBUMIN SERPL-MCNC: 4.6 G/DL (ref 3.5–4.8)
ALBUMIN/GLOB SERPL: 2.1 {RATIO} (ref 1.2–2.2)
ALP SERPL-CCNC: 79 IU/L (ref 39–117)
ALT SERPL-CCNC: 16 IU/L (ref 0–32)
AST SERPL-CCNC: 20 IU/L (ref 0–40)
BILIRUB SERPL-MCNC: 0.7 MG/DL (ref 0–1.2)
BUN SERPL-MCNC: 16 MG/DL (ref 8–27)
BUN/CREAT SERPL: 25 (ref 12–28)
CALCIUM SERPL-MCNC: 9.4 MG/DL (ref 8.7–10.3)
CHLORIDE SERPL-SCNC: 102 MMOL/L (ref 96–106)
CHOLEST SERPL-MCNC: 177 MG/DL (ref 100–199)
CO2 SERPL-SCNC: 25 MMOL/L (ref 20–29)
CREAT SERPL-MCNC: 0.63 MG/DL (ref 0.57–1)
ERYTHROCYTE [DISTWIDTH] IN BLOOD BY AUTOMATED COUNT: 13.5 % (ref 12.3–15.4)
EST. AVERAGE GLUCOSE BLD GHB EST-MCNC: 114 MG/DL
GLOBULIN SER CALC-MCNC: 2.2 G/DL (ref 1.5–4.5)
GLUCOSE SERPL-MCNC: 95 MG/DL (ref 65–99)
HBA1C MFR BLD: 5.6 % (ref 4.8–5.6)
HCT VFR BLD AUTO: 38.4 % (ref 34–46.6)
HDLC SERPL-MCNC: 59 MG/DL
HGB BLD-MCNC: 13.1 G/DL (ref 11.1–15.9)
LDLC SERPL CALC-MCNC: 91 MG/DL (ref 0–99)
MCH RBC QN AUTO: 31 PG (ref 26.6–33)
MCHC RBC AUTO-ENTMCNC: 34.1 G/DL (ref 31.5–35.7)
MCV RBC AUTO: 91 FL (ref 79–97)
PLATELET # BLD AUTO: 174 X10E3/UL (ref 150–450)
POTASSIUM SERPL-SCNC: 4.1 MMOL/L (ref 3.5–5.2)
PROT SERPL-MCNC: 6.8 G/DL (ref 6–8.5)
RBC # BLD AUTO: 4.22 X10E6/UL (ref 3.77–5.28)
SODIUM SERPL-SCNC: 142 MMOL/L (ref 134–144)
TRIGL SERPL-MCNC: 136 MG/DL (ref 0–149)
TSH SERPL DL<=0.005 MIU/L-ACNC: 1.74 UIU/ML (ref 0.45–4.5)
VLDLC SERPL CALC-MCNC: 27 MG/DL (ref 5–40)
WBC # BLD AUTO: 4 X10E3/UL (ref 3.4–10.8)

## 2019-09-26 NOTE — PROGRESS NOTES
After obtaining consent, and per verbal order from Dr. Jesus Godwin, patient received influenza vaccine given by Navi Rizzo in left Deltoid. Influenza Vaccine 0.5 mL IM now. Patient was observed for 10 minutes post injection. Patient tolerated injection well. VIS given.

## 2019-12-18 ENCOUNTER — HOSPITAL ENCOUNTER (OUTPATIENT)
Dept: MAMMOGRAPHY | Age: 74
Discharge: HOME | End: 2019-12-18
Attending: INTERNAL MEDICINE
Payer: MEDICARE

## 2019-12-18 DIAGNOSIS — Z00.00 MEDICARE ANNUAL WELLNESS VISIT, SUBSEQUENT: ICD-10-CM

## 2019-12-18 DIAGNOSIS — R73.01 IFG (IMPAIRED FASTING GLUCOSE): ICD-10-CM

## 2019-12-18 DIAGNOSIS — E78.2 MIXED HYPERLIPIDEMIA: ICD-10-CM

## 2019-12-18 DIAGNOSIS — C50.911 MALIGNANT NEOPLASM OF RIGHT FEMALE BREAST, UNSPECIFIED ESTROGEN RECEPTOR STATUS, UNSPECIFIED SITE OF BREAST (HCC): ICD-10-CM

## 2019-12-18 PROCEDURE — 77065 DX MAMMO INCL CAD UNI: CPT

## 2020-06-25 ENCOUNTER — HOSPITAL ENCOUNTER (OUTPATIENT)
Dept: MAMMOGRAPHY | Age: 75
Discharge: HOME | End: 2020-06-25
Attending: INTERNAL MEDICINE
Payer: MEDICARE

## 2020-06-25 DIAGNOSIS — D05.81 OTHER SPECIFIED TYPE OF CARCINOMA IN SITU OF RIGHT BREAST: ICD-10-CM

## 2020-06-25 DIAGNOSIS — D05.91 UNSPECIFIED TYPE OF CARCINOMA IN SITU OF RIGHT BREAST: ICD-10-CM

## 2020-06-25 PROCEDURE — 77066 DX MAMMO INCL CAD BI: CPT

## 2020-08-09 RX ORDER — ATORVASTATIN CALCIUM 20 MG/1
TABLET, FILM COATED ORAL
Qty: 90 TAB | Refills: 3 | Status: SHIPPED | OUTPATIENT
Start: 2020-08-09 | End: 2020-11-02

## 2020-08-18 ENCOUNTER — TELEPHONE (OUTPATIENT)
Dept: INTERNAL MEDICINE CLINIC | Age: 75
End: 2020-08-18

## 2020-08-18 DIAGNOSIS — Z13.29 SCREENING FOR THYROID DISORDER: ICD-10-CM

## 2020-08-18 DIAGNOSIS — C50.911 MALIGNANT NEOPLASM OF RIGHT FEMALE BREAST, UNSPECIFIED ESTROGEN RECEPTOR STATUS, UNSPECIFIED SITE OF BREAST (HCC): ICD-10-CM

## 2020-08-18 DIAGNOSIS — R73.01 IFG (IMPAIRED FASTING GLUCOSE): ICD-10-CM

## 2020-08-18 DIAGNOSIS — E78.1 PURE HYPERGLYCERIDEMIA: Primary | ICD-10-CM

## 2020-08-19 NOTE — TELEPHONE ENCOUNTER
Katerin Ratliff MD  [facility], LPN    Caller: Unspecified (Yesterday, 12:41 PM)               Lipids, cmp, tsh, Radha Dominga Dr. Shy Arriaga; labs ordered and mailed to pt.

## 2020-10-21 ENCOUNTER — HOSPITAL ENCOUNTER (OUTPATIENT)
Dept: LAB | Age: 75
Discharge: HOME | End: 2020-10-21
Payer: MEDICARE

## 2020-10-21 PROCEDURE — 80053 COMPREHEN METABOLIC PANEL: CPT

## 2020-10-21 PROCEDURE — 83036 HEMOGLOBIN GLYCOSYLATED A1C: CPT

## 2020-10-21 PROCEDURE — 36415 COLL VENOUS BLD VENIPUNCTURE: CPT

## 2020-10-21 PROCEDURE — 80061 LIPID PANEL: CPT

## 2020-10-21 PROCEDURE — 84443 ASSAY THYROID STIM HORMONE: CPT

## 2020-10-21 PROCEDURE — 85027 COMPLETE CBC AUTOMATED: CPT

## 2020-10-22 LAB
ALBUMIN SERPL-MCNC: 4.5 G/DL (ref 3.7–4.7)
ALBUMIN/GLOB SERPL: 2 {RATIO} (ref 1.2–2.2)
ALP SERPL-CCNC: 82 IU/L (ref 39–117)
ALT SERPL-CCNC: 20 IU/L (ref 0–32)
AST SERPL-CCNC: 20 IU/L (ref 0–40)
BILIRUB SERPL-MCNC: 0.6 MG/DL (ref 0–1.2)
BUN SERPL-MCNC: 23 MG/DL (ref 8–27)
BUN/CREAT SERPL: 30 (ref 12–28)
CALCIUM SERPL-MCNC: 9.7 MG/DL (ref 8.7–10.3)
CHLORIDE SERPL-SCNC: 104 MMOL/L (ref 96–106)
CHOLEST SERPL-MCNC: 200 MG/DL (ref 100–199)
CO2 SERPL-SCNC: 27 MMOL/L (ref 20–29)
CREAT SERPL-MCNC: 0.77 MG/DL (ref 0.57–1)
ERYTHROCYTE [DISTWIDTH] IN BLOOD BY AUTOMATED COUNT: 12.7 % (ref 11.7–15.4)
EST. AVERAGE GLUCOSE BLD GHB EST-MCNC: 114 MG/DL
GLOBULIN SER CALC-MCNC: 2.3 G/DL (ref 1.5–4.5)
GLUCOSE SERPL-MCNC: 96 MG/DL (ref 65–99)
HBA1C MFR BLD: 5.6 % (ref 4.8–5.6)
HCT VFR BLD AUTO: 41.8 % (ref 34–46.6)
HDLC SERPL-MCNC: 57 MG/DL
HGB BLD-MCNC: 14.1 G/DL (ref 11.1–15.9)
LDLC SERPL CALC-MCNC: 124 MG/DL (ref 0–99)
MCH RBC QN AUTO: 31 PG (ref 26.6–33)
MCHC RBC AUTO-ENTMCNC: 33.7 G/DL (ref 31.5–35.7)
MCV RBC AUTO: 92 FL (ref 79–97)
PLATELET # BLD AUTO: 161 X10E3/UL (ref 150–450)
POTASSIUM SERPL-SCNC: 4.6 MMOL/L (ref 3.5–5.2)
PROT SERPL-MCNC: 6.8 G/DL (ref 6–8.5)
RBC # BLD AUTO: 4.55 X10E6/UL (ref 3.77–5.28)
SODIUM SERPL-SCNC: 142 MMOL/L (ref 134–144)
TRIGL SERPL-MCNC: 107 MG/DL (ref 0–149)
TSH SERPL DL<=0.005 MIU/L-ACNC: 1.59 UIU/ML (ref 0.45–4.5)
VLDLC SERPL CALC-MCNC: 19 MG/DL (ref 5–40)
WBC # BLD AUTO: 4.5 X10E3/UL (ref 3.4–10.8)

## 2020-10-28 NOTE — PROGRESS NOTES
HISTORY OF PRESENT ILLNESS  Jefferson Rao is a 70 y.o. female. HPI     Last here 9/25/19 Pt is here for routine care. This is an established visit completed with telemedicine was completed with video assist  the patient acknowledges and agrees to this method of visitation doxyme     She c/o leg cramps at night  She has been taking otc potassium   Discussed this is okay, discussed leg stretches and tonic water     BP at home 117/81  HR 93      Wt is 116 lbs per pt - down 3 lbs x lov   Pt is not trying to work on wt loss, states she has been walking more   Her weight is within normal ranges     Reviewed labs       Continues lipitor 20mg daily for cholesterol, reports compliance  Will increase to 40 mg due to elevation on last labs    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 Du Oropeza (uro-gyn) for leaky bladder and uterine prolapse  Pt had a bilateral oophorectomy and mesh placement 10/19/17  Of note, pt had a hysterectomy in the 1970s   Not needed to f/u recently      Previously provided referral for surg for lipoma   Pt had this lipoma excised 11/26/18 with Dr Schwartz Case      Pt follows with Dr Susi Brar (derm)  Last visit was 10/18     Pt is currently taking krill oil      Continues on vit D + ca       Pt is on zyrtec qAM and hydroxyzine nightly      reviewed mammo 6/25/20 nl    Denies falls  No difficulty with hearing  Doesn't drink much alcohol   Pt lives with     Pt is functionally independent       No memory concerns   Knows the month, date, year, location   Can recall 3/3 objects      ACP not on file.  SDM is her  Irma Fine).   Provided information today.       PREVENTIVE:  Colonoscopy: 2/04/13, Dr. Silvino Valero, likely 10 year repeat, does not specify on report, advised to contact office  Pap: Jeffery Paci, 4/17, hysterectomy in 1970s, due reminded  Mammogram: 20 nl  Dexa: declines   Tdap: declines  Pneumovax: 16  Huheudh00: 3/16/17  Zostavax: 10/26/12  Shingrix: 1st round completed , 2nd round completed   Flu shot: 19, due now    Hep C Screen: 3/17, negative  A1C: 7/15 5.9,  5.8,  5.6, 3/17 5.6,  5.7,  5.7 10/20 5.6  Eye exam: Dr. Dc Nolen shows, , scheduled   Lipids: 10/20   EK17, nsr    Patient Active Problem List    Diagnosis Date Noted    Pure hyperglyceridemia 2016    Prediabetes 2016    Breast cancer ([facility] [address] 2015    Personal history of colonic polyps 2013     Current Outpatient Medications   Medication Sig Dispense Refill    atorvastatin (LIPITOR) 40 mg tablet Take 1 Tab by mouth daily. 90 Tab 2    cetirizine (ZYRTEC) 10 mg tablet       krill-omega-3-dha-epa-lipids (KRILL OIL) 118-76-13-50 mg cap Take 1 Cap by mouth daily.  multivitamin (ONE A DAY) tablet Take 1 Tab by mouth daily.          Past Surgical History:   Procedure Laterality Date    BREAST SURGERY PROCEDURE UNLISTED      right lumpectomy    HX BREAST BIOPSY Right     HX BREAST LUMPECTOMY Right 2006    no radiation or chemo    HX GYN      hysterectomy 70's    HX PREMALIG/BENIGN SKIN LESION EXCISION      Excison soft tissue mass of back- Ibrahima Benz MD    HX UROLOGICAL  10/19/2017    bladder prolapse      Lab Results   Component Value Date/Time    WBC 4.5 10/21/2020 09:24 AM    HGB 14.1 10/21/2020 09:24 AM    HCT 41.8 10/21/2020 09:24 AM    PLATELET 546  09:24 AM    MCV 92 10/21/2020 09:24 AM     Lab Results   Component Value Date/Time    Cholesterol, total 200 (H) 10/21/2020 09:24 AM    HDL Cholesterol 57 10/21/2020 09:24 AM    LDL, calculated 91 2019 09:00 AM    LDL Chol Calc (NIH) 124 (H) 10/21/2020 09:24 AM    Triglyceride 107 10/21/2020 09:24 AM     Lab Results   Component Value Date/Time    GFR est non-AA 77 10/21/2020 09:24 AM    GFR est AA 89 10/21/2020 09:24 AM    Creatinine 0.77 10/21/2020 09:24 AM    BUN 23 10/21/2020 09:24 AM    Sodium 142 10/21/2020 09:24 AM    Potassium 4.6 10/21/2020 09:24 AM    Chloride 104 10/21/2020 09:24 AM    CO2 27 10/21/2020 09:24 AM        Review of Systems   Respiratory: Negative for shortness of breath. Cardiovascular: Negative for chest pain. Physical Exam  Constitutional:       General: She is not in acute distress. Appearance: Normal appearance. She is not ill-appearing, toxic-appearing or diaphoretic. HENT:      Head: Normocephalic and atraumatic. Eyes:      General:         Right eye: No discharge. Left eye: No discharge. Conjunctiva/sclera: Conjunctivae normal.   Pulmonary:      Effort: No respiratory distress. Neurological:      Mental Status: She is alert and oriented to person, place, and time. Mental status is at baseline. Gait: Gait normal.   Psychiatric:         Mood and Affect: Mood normal.         Behavior: Behavior normal.         ASSESSMENT and PLAN    ICD-10-CM ICD-9-CM    1. Medicare annual wellness visit, subsequent        Z00.00 V70.0    2. Malignant neoplasm of right female breast, unspecified estrogen receptor status, unspecified site of breast [facility]       Mammogram up-to-date up-to-date with Dr. Heather Moreno     C50.911 174.9    3. Pure hyperglyceridemia         Increase Lipitor to 40 mg daily E78.1 272.1    4. Prediabetes         Diet controlled monitor A1c R73.03 790.29    5. Leg cramps         Discussed OTC potassium is fine    Tonic water    Leg stretches R25.2 729.82       Depression screen reviewed and negative      Scribed by Gena Rodriguez, as dictated by Dr. Lanie Harrison. Current diagnosis and concerns discussed with pt at length. Pt understands risks and benefits or current treatment plan and medications, and accepts the treatment and medication with any possible risks. Pt asks appropriate questions, which were answered.  Pt was instructed to call with any concerns or problems. I have reviewed the note documented by the scribe. The services provided are my own. The documentation is accurate     Archie Opal, who was evaluated through a synchronous (real-time) audio-video encounter, and/or her healthcare decision maker, is aware that it is a billable service, with coverage as determined by her insurance carrier. She provided verbal consent to proceed: Yes, and patient identification was verified. It was conducted pursuant to the emergency declaration under the [address] [place] [address] and the [employer] Act. A caregiver was present when appropriate. Ability to conduct physical exam was limited. I was at home. The patient was at home.

## 2020-11-02 ENCOUNTER — VIRTUAL VISIT (OUTPATIENT)
Dept: INTERNAL MEDICINE CLINIC | Age: 75
End: 2020-11-02
Payer: MEDICARE

## 2020-11-02 DIAGNOSIS — C50.911 MALIGNANT NEOPLASM OF RIGHT FEMALE BREAST, UNSPECIFIED ESTROGEN RECEPTOR STATUS, UNSPECIFIED SITE OF BREAST (HCC): ICD-10-CM

## 2020-11-02 DIAGNOSIS — Z00.00 MEDICARE ANNUAL WELLNESS VISIT, SUBSEQUENT: Primary | ICD-10-CM

## 2020-11-02 DIAGNOSIS — R73.03 PREDIABETES: ICD-10-CM

## 2020-11-02 DIAGNOSIS — R25.2 LEG CRAMPS: ICD-10-CM

## 2020-11-02 DIAGNOSIS — E78.1 PURE HYPERGLYCERIDEMIA: ICD-10-CM

## 2020-11-02 PROCEDURE — G0439 PPPS, SUBSEQ VISIT: HCPCS | Performed by: INTERNAL MEDICINE

## 2020-11-02 PROCEDURE — G8400 PT W/DXA NO RESULTS DOC: HCPCS | Performed by: INTERNAL MEDICINE

## 2020-11-02 PROCEDURE — 3017F COLORECTAL CA SCREEN DOC REV: CPT | Performed by: INTERNAL MEDICINE

## 2020-11-02 PROCEDURE — G8510 SCR DEP NEG, NO PLAN REQD: HCPCS | Performed by: INTERNAL MEDICINE

## 2020-11-02 PROCEDURE — G0463 HOSPITAL OUTPT CLINIC VISIT: HCPCS | Performed by: INTERNAL MEDICINE

## 2020-11-02 PROCEDURE — G8421 BMI NOT CALCULATED: HCPCS | Performed by: INTERNAL MEDICINE

## 2020-11-02 PROCEDURE — 1090F PRES/ABSN URINE INCON ASSESS: CPT | Performed by: INTERNAL MEDICINE

## 2020-11-02 PROCEDURE — 99213 OFFICE O/P EST LOW 20 MIN: CPT | Performed by: INTERNAL MEDICINE

## 2020-11-02 PROCEDURE — G9899 SCRN MAM PERF RSLTS DOC: HCPCS | Performed by: INTERNAL MEDICINE

## 2020-11-02 PROCEDURE — 1101F PT FALLS ASSESS-DOCD LE1/YR: CPT | Performed by: INTERNAL MEDICINE

## 2020-11-02 PROCEDURE — G8427 DOCREV CUR MEDS BY ELIG CLIN: HCPCS | Performed by: INTERNAL MEDICINE

## 2020-11-02 PROCEDURE — G8536 NO DOC ELDER MAL SCRN: HCPCS | Performed by: INTERNAL MEDICINE

## 2020-11-02 RX ORDER — ATORVASTATIN CALCIUM 40 MG/1
40 TABLET, FILM COATED ORAL DAILY
Qty: 90 TAB | Refills: 2 | Status: SHIPPED | OUTPATIENT
Start: 2020-11-02 | End: 2021-09-11

## 2020-11-02 NOTE — PATIENT INSTRUCTIONS
Medicare Wellness Visit, Female The best way to live healthy is to have a lifestyle where you eat a well-balanced diet, exercise regularly, limit alcohol use, and quit all forms of tobacco/nicotine, if applicable. Regular preventive services are another way to keep healthy. Preventive services (vaccines, screening tests, monitoring & exams) can help personalize your care plan, which helps you manage your own care. Screening tests can find health problems at the earliest stages, when they are easiest to treat. [facility] follows the current, evidence-based guidelines published by the [facility] ([facility]) when recommending preventive services for our patients. Because we follow these guidelines, sometimes recommendations change over time as research supports it. (For example, mammograms used to be recommended annually. Even though Medicare will still pay for an annual mammogram, the newer guidelines recommend a mammogram every two years for women of average risk). Of course, you and your doctor may decide to screen more often for some diseases, based on your risk and your co-morbidities (chronic disease you are already diagnosed with). Preventive services for you include: - Medicare offers their members a free annual wellness visit, which is time for you and your primary care provider to discuss and plan for your preventive service needs. Take advantage of this benefit every year! 
-All adults over the age of 72 should receive the recommended pneumonia vaccines. Current USPSTF guidelines recommend a series of two vaccines for the best pneumonia protection.  
-All adults should have a flu vaccine yearly and a tetanus vaccine every 10 years.  
-All adults age 48 and older should receive the shingles vaccines (series of two vaccines). -All adults age 38-68 who are overweight should have a diabetes screening test once every three years. -All adults born between [birth year] and 1965 should be screened once for Hepatitis C. 
-Other screening tests and preventive services for persons with diabetes include: an eye exam to screen for diabetic retinopathy, a kidney function test, a foot exam, and stricter control over your cholesterol.  
-Cardiovascular screening for adults with routine risk involves an electrocardiogram (ECG) at intervals determined by your doctor.  
-Colorectal cancer screenings should be done for adults age 54-65 with no increased risk factors for colorectal cancer. There are a number of acceptable methods of screening for this type of cancer. Each test has its own benefits and drawbacks. Discuss with your doctor what is most appropriate for you during your annual wellness visit. The different tests include: colonoscopy (considered the best screening method), a fecal occult blood test, a fecal DNA test, and sigmoidoscopy. 
 
-A bone mass density test is recommended when a woman turns 65 to screen for osteoporosis. This test is only recommended one time, as a screening. Some providers will use this same test as a disease monitoring tool if you already have osteoporosis. -Breast cancer screenings are recommended every other year for women of normal risk, age 54-69. 
-Cervical cancer screenings for women over age 72 are only recommended with certain risk factors. Here is a list of your current Health Maintenance items (your personalized list of preventive services) with a due date: 
Health Maintenance Due Topic Date Due  Glaucoma Screening   12/09/2017  Yearly Flu Vaccine (1) 09/01/2020 Mary Cornell Annual Well Visit  09/25/2020

## 2020-11-13 ENCOUNTER — TELEPHONE (OUTPATIENT)
Dept: INTERNAL MEDICINE CLINIC | Age: 75
End: 2020-11-13

## 2020-11-13 NOTE — TELEPHONE ENCOUNTER
#[phone]  Pt states she never received her lab results in the mail. Please mail those as soon as possible. Address verified.

## 2020-11-13 NOTE — LETTER
11/13/2020 1:06 PM 
 
Ms. Mookie Corcoran [place] [address] [id number] Results for orders placed or performed in visit on 08/18/20 HEMOGLOBIN A1C WITH EAG Result Value Ref Range Hemoglobin A1c 5.6 4.8 - 5.6 % Estimated average glucose 114 mg/dL METABOLIC PANEL, COMPREHENSIVE Result Value Ref Range Glucose 96 65 - 99 mg/dL BUN 23 8 - 27 mg/dL Creatinine 0.77 0.57 - 1.00 mg/dL GFR est non-AA 77 >59 mL/min/1.73 GFR est AA 89 >59 mL/min/1.73  
 BUN/Creatinine ratio 30 (H) 12 - 28 Sodium 142 134 - 144 mmol/L Potassium 4.6 3.5 - 5.2 mmol/L Chloride 104 96 - 106 mmol/L  
 CO2 27 20 - 29 mmol/L Calcium 9.7 8.7 - 10.3 mg/dL Protein, total 6.8 6.0 - 8.5 g/dL Albumin 4.5 3.7 - 4.7 g/dL GLOBULIN, TOTAL 2.3 1.5 - 4.5 g/dL A-G Ratio 2.0 1.2 - 2.2 Bilirubin, total 0.6 0.0 - 1.2 mg/dL Alk. phosphatase 82 39 - 117 IU/L  
 AST (SGOT) 20 0 - 40 IU/L  
 ALT (SGPT) 20 0 - 32 IU/L  
TSH 3RD GENERATION Result Value Ref Range TSH 1.590 0.450 - 4.500 uIU/mL CBC W/O DIFF Result Value Ref Range WBC 4.5 3.4 - 10.8 x10E3/uL  
 RBC 4.55 3.77 - 5.28 x10E6/uL HGB 14.1 11.1 - 15.9 g/dL HCT 41.8 34.0 - 46.6 % MCV 92 79 - 97 fL  
 MCH 31.0 26.6 - 33.0 pg  
 MCHC 33.7 31.5 - 35.7 g/dL  
 RDW 12.7 11.7 - 15.4 % PLATELET 133 226 - 311 x10E3/uL LIPID PANEL Result Value Ref Range Cholesterol, total 200 (H) 100 - 199 mg/dL Triglyceride 107 0 - 149 mg/dL HDL Cholesterol 57 >39 mg/dL VLDL Cholesterol Chance 19 5 - 40 mg/dL LDL Chol Calc (NIH) 124 (H) 0 - 99 mg/dL Sincerely, 
 
 
Jimenez Pike MD

## 2021-01-22 ENCOUNTER — TELEPHONE (OUTPATIENT)
Dept: INTERNAL MEDICINE CLINIC | Age: 76
End: 2021-01-22

## 2021-01-22 NOTE — TELEPHONE ENCOUNTER
----- Message from Tariq Back sent at 1/22/2021 12:59 PM EST -----  Regarding: Dr. Liza Covarrubias  telephone  Caller (first and last name if not the patient or from practice): N/A  Caller's relationship to patient (if not from a practice): N/A  Name of caller (if calling from a practice): N/A  Patient insurance Type: Medicare/    Name of practice: [facility]   Specialist's title, first, and last name: Dr. Nany Mota  Specialist Type: [facility]  Office Phone Number: [phone]  Fax number: [phone]  Date and time of appointment: N/A  Reason for appointment: [facility]       Message copied/pasted from Lex

## 2021-01-25 NOTE — TELEPHONE ENCOUNTER
Called and spoke with pt, she stated no referral needed. Pt states that she is waiting on a call from Dr. Leonel Marcelino office for an appt.

## 2021-01-28 ENCOUNTER — TELEPHONE (OUTPATIENT)
Dept: INTERNAL MEDICINE CLINIC | Age: 76
End: 2021-01-28

## 2021-01-28 DIAGNOSIS — M19.90 ARTHRITIS: Primary | ICD-10-CM

## 2021-01-28 NOTE — TELEPHONE ENCOUNTER
#[phone] pt states last week she had requested a referral from Dr. Francisca Harp to be faxed to Dr. Klaudia Hackett office so she can get an appt. This is not an insurance referral.    Please fax referral  Fax #[phone]    Please call pt when this is done or to inform her it has been done.

## 2021-06-14 ENCOUNTER — TELEPHONE (OUTPATIENT)
Dept: INTERNAL MEDICINE CLINIC | Age: 76
End: 2021-06-14

## 2021-06-14 DIAGNOSIS — R73.03 PREDIABETES: ICD-10-CM

## 2021-06-14 DIAGNOSIS — I10 ESSENTIAL HYPERTENSION: ICD-10-CM

## 2021-06-14 DIAGNOSIS — E03.9 HYPOTHYROIDISM, UNSPECIFIED TYPE: ICD-10-CM

## 2021-06-14 DIAGNOSIS — E78.1 PURE HYPERGLYCERIDEMIA: Primary | ICD-10-CM

## 2021-06-14 NOTE — TELEPHONE ENCOUNTER
#[phone] pt has a cpe in October and is asking that lab orders be put in the system so she can get done prior to appt. Thanks.

## 2021-07-13 ENCOUNTER — TRANSCRIBE ORDER (OUTPATIENT)
Dept: SCHEDULING | Age: 76
End: 2021-07-13

## 2021-07-13 DIAGNOSIS — Z12.31 VISIT FOR SCREENING MAMMOGRAM: Primary | ICD-10-CM

## 2021-07-13 DIAGNOSIS — D05.81: Primary | ICD-10-CM

## 2021-08-03 ENCOUNTER — HOSPITAL ENCOUNTER (OUTPATIENT)
Dept: MAMMOGRAPHY | Age: 76
Discharge: HOME | End: 2021-08-03
Attending: INTERNAL MEDICINE
Payer: MEDICARE

## 2021-08-03 DIAGNOSIS — Z12.39 SCREENING BREAST EXAMINATION: ICD-10-CM

## 2021-08-03 DIAGNOSIS — D05.81: ICD-10-CM

## 2021-08-03 PROCEDURE — 77067 SCR MAMMO BI INCL CAD: CPT

## 2021-09-11 RX ORDER — ATORVASTATIN CALCIUM 40 MG/1
TABLET, FILM COATED ORAL
Qty: 90 TABLET | Refills: 3 | Status: SHIPPED | OUTPATIENT
Start: 2021-09-11 | End: 2022-09-12 | Stop reason: SDUPTHER

## 2021-09-28 ENCOUNTER — APPOINTMENT (OUTPATIENT)
Dept: INTERNAL MEDICINE CLINIC | Age: 76
End: 2021-09-28

## 2021-09-28 DIAGNOSIS — E03.9 HYPOTHYROIDISM, UNSPECIFIED TYPE: ICD-10-CM

## 2021-09-28 DIAGNOSIS — R73.03 PREDIABETES: ICD-10-CM

## 2021-09-28 DIAGNOSIS — E78.1 PURE HYPERGLYCERIDEMIA: ICD-10-CM

## 2021-09-28 DIAGNOSIS — I10 ESSENTIAL HYPERTENSION: ICD-10-CM

## 2021-09-28 LAB
ALBUMIN SERPL-MCNC: 4 G/DL (ref 3.5–5)
ALBUMIN/GLOB SERPL: 1.3 {RATIO} (ref 1.1–2.2)
ALP SERPL-CCNC: 80 U/L (ref 45–117)
ALT SERPL-CCNC: 29 U/L (ref 12–78)
ANION GAP SERPL CALC-SCNC: 4 MMOL/L (ref 5–15)
AST SERPL-CCNC: 21 U/L (ref 15–37)
BILIRUB SERPL-MCNC: 0.7 MG/DL (ref 0.2–1)
BUN SERPL-MCNC: 18 MG/DL (ref 6–20)
BUN/CREAT SERPL: 24 (ref 12–20)
CALCIUM SERPL-MCNC: 9.3 MG/DL (ref 8.5–10.1)
CHLORIDE SERPL-SCNC: 106 MMOL/L (ref 97–108)
CHOLEST SERPL-MCNC: 176 MG/DL
CO2 SERPL-SCNC: 29 MMOL/L (ref 21–32)
CREAT SERPL-MCNC: 0.74 MG/DL (ref 0.55–1.02)
ERYTHROCYTE [DISTWIDTH] IN BLOOD BY AUTOMATED COUNT: 12.7 % (ref 11.5–14.5)
EST. AVERAGE GLUCOSE BLD GHB EST-MCNC: 111 MG/DL
GLOBULIN SER CALC-MCNC: 3.1 G/DL (ref 2–4)
GLUCOSE SERPL-MCNC: 96 MG/DL (ref 65–100)
HBA1C MFR BLD: 5.5 % (ref 4–5.6)
HCT VFR BLD AUTO: 41.6 % (ref 35–47)
HDLC SERPL-MCNC: 65 MG/DL
HDLC SERPL: 2.7 {RATIO} (ref 0–5)
HGB BLD-MCNC: 13.4 G/DL (ref 11.5–16)
LDLC SERPL CALC-MCNC: 86.6 MG/DL (ref 0–100)
MCH RBC QN AUTO: 30.3 PG (ref 26–34)
MCHC RBC AUTO-ENTMCNC: 32.2 G/DL (ref 30–36.5)
MCV RBC AUTO: 94.1 FL (ref 80–99)
NRBC # BLD: 0 K/UL (ref 0–0.01)
NRBC BLD-RTO: 0 PER 100 WBC
PLATELET # BLD AUTO: 158 K/UL (ref 150–400)
PMV BLD AUTO: 11.2 FL (ref 8.9–12.9)
POTASSIUM SERPL-SCNC: 3.7 MMOL/L (ref 3.5–5.1)
PROT SERPL-MCNC: 7.1 G/DL (ref 6.4–8.2)
RBC # BLD AUTO: 4.42 M/UL (ref 3.8–5.2)
SODIUM SERPL-SCNC: 139 MMOL/L (ref 136–145)
TRIGL SERPL-MCNC: 122 MG/DL (ref ?–150)
TSH SERPL DL<=0.05 MIU/L-ACNC: 1.72 UIU/ML (ref 0.36–3.74)
VLDLC SERPL CALC-MCNC: 24.4 MG/DL
WBC # BLD AUTO: 3.9 K/UL (ref 3.6–11)

## 2021-10-04 NOTE — PROGRESS NOTES
HISTORY OF PRESENT ILLNESS  Paco Chester is a 78 y.o. female. HPI     Last here 11/02/20. Pt is here for routine care. 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 Sherry Quiñonez (rheum) for osteoarthritis in hand  She was told that she could use voltaren gel     Pt follows with Dr. Carina Beavers (onco) for h/o DCIS  Pt follows annually with her in March  Last visit 3/21- will get notes for review  Pt was previously on femara - stable       Pt follows with Dr. Merlyn Melissa (uro-gyn) for leaky bladder and uterine prolapse  Pt had a bilateral oophorectomy and mesh placement 10/19/17  Of note, pt had a hysterectomy in the 1970s   Not needed to f/u recently      Previously provided referral for surg for lipoma   Pt had this lipoma excised 11/26/18 with Dr Popeye Crawford     Pt follows with Dr Aye Harris (derm)  Last visit was 10/18    Taking lipitor 40mg daily      Pt is currently taking krill oil      Continues on vit D + ca       Pt is on zyrtec qAM and hydroxyzine nightly    Reviewed mammo 8/03/21: negative     ACP not on file.  SDM is her  Geneva Guerra).   Provided information in the past.       PREVENTIVE:  Colonoscopy: 2/04/13, Dr. Kenan Marie, likely 10 year repeat, does not specify on report, advised to contact office  Pap: Colosiel Guzman, 4/17, hysterectomy in 1970s, due reminded  Mammogram: 8/03/21 nl  Dexa: declines   Tdap: declines  Pneumovax: 9/20/16  Mnexwxz31: 3/16/17  Zostavax: 10/26/12  Shingrix: 1st round completed 9/18, 2nd round completed   Flu shot: 19, will get today 10/05/21  Hep C Screen: 3/17, negative  A1C: 7/15 5.9,  5.8,  5.6, 3/17 5.6,  5.7,  5.7 10/20 5.6  5.5  Eye exam: Dr. Tyson Hurley   Lipids:  LDL 86  EK17, nsr    Patient Active Problem List    Diagnosis Date Noted    Pure hyperglyceridemia 2016    Prediabetes 2016    Breast cancer ([facility] [address] 2015    Personal history of colonic polyps 2013     Current Outpatient Medications   Medication Sig Dispense Refill    B.infantis-B.ani-B.long-B.bifi (Probiotic 4X) 10-15 mg TbEC Take  by mouth.  atorvastatin (LIPITOR) 40 mg tablet TAKE 1 TABLET DAILY 90 Tablet 3    cetirizine (ZYRTEC) 10 mg tablet       krill-omega-3-dha-epa-lipids (KRILL OIL) 941-01-32-50 mg cap Take 1 Cap by mouth daily.  multivitamin (ONE A DAY) tablet Take 1 Tab by mouth daily.          Past Surgical History:   Procedure Laterality Date    HX BREAST BIOPSY Right     HX BREAST LUMPECTOMY Right 2006    no radiation or chemo    HX GYN      hysterectomy 70's    HX PREMALIG/BENIGN SKIN LESION EXCISION      Excison soft tissue mass of back- Dl Cintron MD    HX UROLOGICAL  10/19/2017    bladder prolapse    CA BREAST SURGERY PROCEDURE UNLISTED      right lumpectomy      Lab Results   Component Value Date/Time    WBC 3.9 2021 09:53 AM    HGB 13.4 2021 09:53 AM    HCT 41.6 2021 09:53 AM    PLATELET 433 68/10/2586 09:53 AM    MCV 94.1 2021 09:53 AM     Lab Results   Component Value Date/Time    Cholesterol, total 176 2021 09:53 AM    HDL Cholesterol 65 2021 09:53 AM    LDL, calculated 86.6 2021 09:53 AM    Triglyceride 122 2021 09:53 AM    CHOL/HDL Ratio 2.7 2021 09:53 AM     Lab Results   Component Value Date/Time    GFR est non-AA >60 2021 09:53 AM    GFR est AA >60 2021 09:53 AM    Creatinine 0.74 2021 09:53 AM    BUN 18 2021 09:53 AM    Sodium 139 2021 09:53 AM Potassium 3.7 09/28/2021 09:53 AM    Chloride 106 09/28/2021 09:53 AM    CO2 29 09/28/2021 09:53 AM        Review of Systems   Respiratory: Negative for shortness of breath. Cardiovascular: Negative for chest pain. Musculoskeletal:        Osteoarthritis pain in R hand       Physical Exam  Constitutional:       General: She is not in acute distress. Appearance: Normal appearance. She is not ill-appearing, toxic-appearing or diaphoretic. HENT:      Head: Normocephalic and atraumatic. Right Ear: External ear normal.      Left Ear: External ear normal.   Eyes:      General:         Right eye: No discharge. Left eye: No discharge. Conjunctiva/sclera: Conjunctivae normal.      Pupils: Pupils are equal, round, and reactive to light. Neck:      Vascular: No carotid bruit. Cardiovascular:      Rate and Rhythm: Normal rate and regular rhythm. Heart sounds: No murmur heard. No friction rub. No gallop. Pulmonary:      Effort: No respiratory distress. Breath sounds: Normal breath sounds. No wheezing or rales. Chest:      Chest wall: No tenderness. Abdominal:      General: Abdomen is flat. There is no distension. Palpations: Abdomen is soft. There is no mass. Tenderness: There is no abdominal tenderness. There is no guarding or rebound. Hernia: No hernia is present. Musculoskeletal:         General: Normal range of motion. Cervical back: Normal range of motion and neck supple. Skin:     General: Skin is warm and dry. Neurological:      Mental Status: She is alert and oriented to person, place, and time. Mental status is at baseline.       Coordination: Coordination normal.      Gait: Gait normal.   Psychiatric:         Mood and Affect: Mood normal.         Behavior: Behavior normal.         ASSESSMENT and PLAN    ICD-10-CM ICD-9-CM    1. Malignant neoplasm of right female breast, unspecified estrogen receptor status, unspecified site of breast ([facility] [address] Mammogram up-to-date in remission   C50.911 174.9    2. Pure hyperglyceridemia     Not controlled on higher dose Lipitor     E78.1 272.1    3. IFG (impaired fasting glucose)     Diet controlled check A1c     R73.01 790.21    4. Generalized OA       Continue diclofenac gel as needed M15.9 715.00    5. Leg cramps       Discussed tonic water and stretching R25.2 729.82         Depression screen reviewed and negative     Scribed by Lala [facility], as dictated by Dr. Kvng Chao. Current diagnosis and concerns discussed with pt at length. Pt understands risks and benefits or current treatment plan and medications, and accepts the treatment and medication with any possible risks. Pt asks appropriate questions, which were answered. Pt was instructed to call with any concerns or problems. I have reviewed the note documented by the scribe. The services provided are my own.   The documentation is accurate

## 2021-10-05 ENCOUNTER — OFFICE VISIT (OUTPATIENT)
Dept: INTERNAL MEDICINE CLINIC | Age: 76
End: 2021-10-05
Payer: MEDICARE

## 2021-10-05 VITALS
WEIGHT: 119 LBS | OXYGEN SATURATION: 97 % | SYSTOLIC BLOOD PRESSURE: 118 MMHG | TEMPERATURE: 98.2 F | RESPIRATION RATE: 16 BRPM | DIASTOLIC BLOOD PRESSURE: 71 MMHG | HEIGHT: 62 IN | BODY MASS INDEX: 21.9 KG/M2 | HEART RATE: 95 BPM

## 2021-10-05 DIAGNOSIS — M15.9 GENERALIZED OA: ICD-10-CM

## 2021-10-05 DIAGNOSIS — R73.01 IFG (IMPAIRED FASTING GLUCOSE): ICD-10-CM

## 2021-10-05 DIAGNOSIS — C50.911 MALIGNANT NEOPLASM OF RIGHT FEMALE BREAST, UNSPECIFIED ESTROGEN RECEPTOR STATUS, UNSPECIFIED SITE OF BREAST (HCC): Primary | ICD-10-CM

## 2021-10-05 DIAGNOSIS — Z23 NEEDS FLU SHOT: ICD-10-CM

## 2021-10-05 DIAGNOSIS — E78.1 PURE HYPERGLYCERIDEMIA: ICD-10-CM

## 2021-10-05 DIAGNOSIS — R25.2 LEG CRAMPS: ICD-10-CM

## 2021-10-05 PROCEDURE — 3017F COLORECTAL CA SCREEN DOC REV: CPT | Performed by: INTERNAL MEDICINE

## 2021-10-05 PROCEDURE — 99213 OFFICE O/P EST LOW 20 MIN: CPT | Performed by: INTERNAL MEDICINE

## 2021-10-05 PROCEDURE — G8400 PT W/DXA NO RESULTS DOC: HCPCS | Performed by: INTERNAL MEDICINE

## 2021-10-05 PROCEDURE — G8754 DIAS BP LESS 90: HCPCS | Performed by: INTERNAL MEDICINE

## 2021-10-05 PROCEDURE — G0463 HOSPITAL OUTPT CLINIC VISIT: HCPCS | Performed by: INTERNAL MEDICINE

## 2021-10-05 PROCEDURE — 1090F PRES/ABSN URINE INCON ASSESS: CPT | Performed by: INTERNAL MEDICINE

## 2021-10-05 PROCEDURE — G9899 SCRN MAM PERF RSLTS DOC: HCPCS | Performed by: INTERNAL MEDICINE

## 2021-10-05 PROCEDURE — G8752 SYS BP LESS 140: HCPCS | Performed by: INTERNAL MEDICINE

## 2021-10-05 PROCEDURE — 90694 VACC AIIV4 NO PRSRV 0.5ML IM: CPT | Performed by: INTERNAL MEDICINE

## 2021-10-05 PROCEDURE — 1101F PT FALLS ASSESS-DOCD LE1/YR: CPT | Performed by: INTERNAL MEDICINE

## 2021-10-05 PROCEDURE — G8510 SCR DEP NEG, NO PLAN REQD: HCPCS | Performed by: INTERNAL MEDICINE

## 2021-10-05 PROCEDURE — G8420 CALC BMI NORM PARAMETERS: HCPCS | Performed by: INTERNAL MEDICINE

## 2021-10-05 PROCEDURE — G8536 NO DOC ELDER MAL SCRN: HCPCS | Performed by: INTERNAL MEDICINE

## 2021-10-05 PROCEDURE — G8427 DOCREV CUR MEDS BY ELIG CLIN: HCPCS | Performed by: INTERNAL MEDICINE

## 2021-10-05 RX ORDER — CHOLECALCIFEROL (VITAMIN D3) 50 MCG
CAPSULE ORAL
COMMUNITY

## 2022-01-11 ENCOUNTER — OFFICE VISIT (OUTPATIENT)
Dept: INTERNAL MEDICINE CLINIC | Age: 77
End: 2022-01-11
Payer: MEDICARE

## 2022-01-11 VITALS
DIASTOLIC BLOOD PRESSURE: 81 MMHG | OXYGEN SATURATION: 100 % | HEART RATE: 92 BPM | TEMPERATURE: 97.8 F | SYSTOLIC BLOOD PRESSURE: 131 MMHG

## 2022-01-11 DIAGNOSIS — R73.01 IFG (IMPAIRED FASTING GLUCOSE): ICD-10-CM

## 2022-01-11 DIAGNOSIS — C50.911 MALIGNANT NEOPLASM OF RIGHT FEMALE BREAST, UNSPECIFIED ESTROGEN RECEPTOR STATUS, UNSPECIFIED SITE OF BREAST (HCC): ICD-10-CM

## 2022-01-11 DIAGNOSIS — R21 RASH: Primary | ICD-10-CM

## 2022-01-11 DIAGNOSIS — B37.9 CANDIDA INFECTION: ICD-10-CM

## 2022-01-11 DIAGNOSIS — E78.1 PURE HYPERGLYCERIDEMIA: ICD-10-CM

## 2022-01-11 DIAGNOSIS — Z00.00 MEDICARE ANNUAL WELLNESS VISIT, SUBSEQUENT: ICD-10-CM

## 2022-01-11 PROCEDURE — G9899 SCRN MAM PERF RSLTS DOC: HCPCS | Performed by: INTERNAL MEDICINE

## 2022-01-11 PROCEDURE — 1090F PRES/ABSN URINE INCON ASSESS: CPT | Performed by: INTERNAL MEDICINE

## 2022-01-11 PROCEDURE — G0439 PPPS, SUBSEQ VISIT: HCPCS | Performed by: INTERNAL MEDICINE

## 2022-01-11 PROCEDURE — G0463 HOSPITAL OUTPT CLINIC VISIT: HCPCS | Performed by: INTERNAL MEDICINE

## 2022-01-11 PROCEDURE — G8400 PT W/DXA NO RESULTS DOC: HCPCS | Performed by: INTERNAL MEDICINE

## 2022-01-11 PROCEDURE — G8536 NO DOC ELDER MAL SCRN: HCPCS | Performed by: INTERNAL MEDICINE

## 2022-01-11 PROCEDURE — 99213 OFFICE O/P EST LOW 20 MIN: CPT | Performed by: INTERNAL MEDICINE

## 2022-01-11 PROCEDURE — G8420 CALC BMI NORM PARAMETERS: HCPCS | Performed by: INTERNAL MEDICINE

## 2022-01-11 PROCEDURE — G8427 DOCREV CUR MEDS BY ELIG CLIN: HCPCS | Performed by: INTERNAL MEDICINE

## 2022-01-11 PROCEDURE — G8510 SCR DEP NEG, NO PLAN REQD: HCPCS | Performed by: INTERNAL MEDICINE

## 2022-01-11 PROCEDURE — 3017F COLORECTAL CA SCREEN DOC REV: CPT | Performed by: INTERNAL MEDICINE

## 2022-01-11 PROCEDURE — 1101F PT FALLS ASSESS-DOCD LE1/YR: CPT | Performed by: INTERNAL MEDICINE

## 2022-01-11 RX ORDER — KETOCONAZOLE 20 MG/G
CREAM TOPICAL DAILY
Qty: 15 G | Refills: 0 | Status: SHIPPED | OUTPATIENT
Start: 2022-01-11

## 2022-01-11 RX ORDER — NYSTATIN 100000 [USP'U]/G
POWDER TOPICAL 4 TIMES DAILY
Qty: 1 EACH | Refills: 1 | Status: SHIPPED | OUTPATIENT
Start: 2022-01-11

## 2022-01-11 NOTE — PROGRESS NOTES
This is the Subsequent Medicare Annual Wellness Exam, performed 12 months or more after the Initial AWV or the last Subsequent AWV    I have reviewed the patient's medical history in detail and updated the computerized patient record. Assessment/Plan   Education and counseling provided:  Are appropriate based on today's review and evaluation    1. Medicare annual wellness visit, subsequent       Depression Risk Factor Screening     3 most recent PHQ Screens 1/11/2022   Little interest or pleasure in doing things Not at all   Feeling down, depressed, irritable, or hopeless Not at all   Total Score PHQ 2 0       Alcohol & Drug Abuse Risk Screen    Do you average more than 1 drink per night or more than 7 drinks a week:  No    On any one occasion in the past three months have you have had more than 3 drinks containing alcohol:  No          Functional Ability and Level of Safety    Hearing: Hearing is good. Activities of Daily Living: The home contains: handrails and grab bars  Patient does total self care      Ambulation: with no difficulty     Fall Risk:  Fall Risk Assessment, last 12 mths 1/11/2022   Able to walk? Yes   Fall in past 12 months? 0   Do you feel unsteady?  0      Abuse Screen:  Patient is not abused   lives w  safe    Cognitive Screening    Has your family/caregiver stated any concerns about your memory: no     Cognitive Screening: Normal - Mini Cog Test     No memory concerns   Pt knows the month, day and year   Can recall 3/3 objects     Health Maintenance Due     Health Maintenance Due   Topic Date Due    Medicare Yearly Exam  11/03/2021       Patient Care Team   Patient Care Team:  Leoncio Ingram MD as PCP - General (Internal Medicine)  Leoncio Ingram MD as PCP - [facility]  [facility] Empaneled Provider  Joana Perez MD (Hematology and Oncology)  Christina Carr DPM (Podiatry)  Cristiane Kirby OD (Ophthalmology)  CHARITO Vázquez MD (Gastroenterology)  Kerry Griggs, MD (Gynecology)  Nikki Yeh MD as Surgeon (General Surgery)  Sara Andrews MD (Rheumatology)    History     Patient Active Problem List   Diagnosis Code    Personal history of colonic polyps Z86.010    Breast cancer ([facility] [address] C50.919    Pure hyperglyceridemia E78.1    Prediabetes R73.03     Past Medical History:   Diagnosis Date    Arthritis     Breast cancer ([facility] [address] 10/31/2009    right lumpectomy    Cancer ([facility] [address]     right breast cancer    Hypercholesterolemia     Personal history of colonic polyps 2013      Past Surgical History:   Procedure Laterality Date    HX BREAST BIOPSY Right     HX BREAST LUMPECTOMY Right 2006    no radiation or chemo    HX GYN      hysterectomy 70's    HX PREMALIG/BENIGN SKIN LESION EXCISION      Excison soft tissue mass of back- Kristen Harman MD    HX UROLOGICAL  10/19/2017    bladder prolapse    [place] BREAST SURGERY PROCEDURE UNLISTED      right lumpectomy     Current Outpatient Medications   Medication Sig Dispense Refill    B.infantis-B.ani-B.long-B.bifi (Probiotic 4X) 10-15 mg TbEC Take  by mouth.  atorvastatin (LIPITOR) 40 mg tablet TAKE 1 TABLET DAILY 90 Tablet 3    cetirizine (ZYRTEC) 10 mg tablet       krill-omega-3-dha-epa-lipids (KRILL OIL) 005-53-49-50 mg cap Take 1 Cap by mouth daily.  multivitamin (ONE A DAY) tablet Take 1 Tab by mouth daily.          No Known Allergies    Family History   Problem Relation Age of Onset    Heart Disease Mother          from mi   Beverly Lobe Diabetes Father     Diabetes Sister     Heart Disease Sister         heart failure    Diabetes Brother     Liver Disease Sister     Heart Disease Son         Stent placed     Social History     Tobacco Use    Smoking status: Former Smoker     Packs/day: 2.00     Years: 10.00     Pack years: 20.00     Quit date: 1978     Years since quittin.0    Smokeless tobacco: Never Used   Substance Use Topics    Alcohol use: Yes     Comment: seldom     ACP not on file.  SDM is her  Mikki Currie. Provided information in the past.         Colonoscopy: 13, Dr. Adin Dominguez, likely 10 year repeat,due   Pap: Norah Sandoval, , hysterectomy in , due reminded, holding off for now  Mammogram: 21 nl  Dexa: declines     Tdap: declines  Pneumovax: 16  [id number]: 3/16/17  Zostavax: 10/26/12  Shingrix: 1st round completed , 2nd round completed   Flu shot: 10/05/21    Hep C Screen: 3/17, negative  A1C:   5.5    Eye exam: Dr. Dalal 2021 annual     Kodi Salines LDL 86  EK17, nsr    Covid: three doses [employer])    Medication reconciliation completed by MA and reviewed by me. Medical/surgical/social/family history reviewed and updated by me. Patient provided AVS and preventative screening table. Patient verbalized understanding of all information discussed.         Tony Yepez MD

## 2022-01-11 NOTE — PATIENT INSTRUCTIONS

## 2022-01-11 NOTE — PROGRESS NOTES
HISTORY OF PRESENT ILLNESS  Fidencio Kaba is a 78 y.o. female. HPI     Last here 10/05/21. Pt is here for acute care. She c/o rash under breast, tried to use baby powder to help with this  This has gotten worse lately. Pt thought that this improved, but after walking it returned  She states that this does not itch  On exam: yeast infection  Will give cream and powder      BP today is 131/81     Wt was 116 lbs per pt    Her weight is within normal ranges     Reviewed labs      She saw Dr. 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 Denver Almazan (uro-gyn) for leaky bladder and uterine prolapse  Pt had a bilateral oophorectomy and mesh placement 10/19/17  Of note, pt had a hysterectomy in the 1970s   Not needed to f/u recently      Previously provided referral for surg for lipoma   Pt had this lipoma excised 11/26/18 with Dr Luz Daniel     Pt follows Lupillo Meza (derm)  Last visit was 10/18     Taking lipitor 40mg daily      Pt is currently taking krill oil      Continues on vit D + ca       Pt is on zyrtec qAM and hydroxyzine nightly    Pt lives with her     Pt is functionally independent   Has grab bar in the shower  Rarely drinks alcohol  Denies difficulty hearing, falls      No memory concerns   Knows the month, date, year, location   Can recall 3/3 objects        ACP not on file.  SDM is her  Justyn Finn).   Provided information in the past.       PREVENTIVE:  Colonoscopy: 2/04/13, Dr. Viviana Padron, likely 10 year repeat, does not specify on report, advised to contact office  Pap: Mohinder Chuckie, 4/17, hysterectomy in 1970s, due reminded holding off for now d/t covid  Mammogram: 8/03/21 nl  Dexa: declines   Tdap: declines  Pneumovax: 9/20/16  [id number]: 3/16/17  Zostavax: 10/26/12  Shingrix: 1st round completed , 2nd round completed   Flu shot: 10/05/21  Hep C Screen: 3/17, negative  A1C: 7/15 5.9,  5.8,  5.6, 3/17 5.6,  5.7,  5.7 10/20 5.6  5.5  Eye exam: Dr. Dalal   Lipids:  LDL 86  EK17, nsr  Covid: three doses [employer])    Patient Active Problem List    Diagnosis Date Noted    Pure hyperglyceridemia 2016    Prediabetes 2016    Breast cancer ([facility] [address] 2015    Personal history of colonic polyps 2013     Current Outpatient Medications   Medication Sig Dispense Refill    B.infantis-B.ani-B.long-B.bifi (Probiotic 4X) 10-15 mg TbEC Take  by mouth.  atorvastatin (LIPITOR) 40 mg tablet TAKE 1 TABLET DAILY 90 Tablet 3    cetirizine (ZYRTEC) 10 mg tablet       krill-omega-3-dha-epa-lipids (KRILL OIL) 583-31-71-50 mg cap Take 1 Cap by mouth daily.  multivitamin (ONE A DAY) tablet Take 1 Tab by mouth daily.          Past Surgical History:   Procedure Laterality Date    HX BREAST BIOPSY Right     HX BREAST LUMPECTOMY Right 2006    no radiation or chemo    HX GYN      hysterectomy 70's    HX PREMALIG/BENIGN SKIN LESION EXCISION      Excison soft tissue mass of back- Horace Schaffer MD    HX UROLOGICAL  10/19/2017    bladder prolapse    [place] BREAST SURGERY PROCEDURE UNLISTED      right lumpectomy      Lab Results   Component Value Date/Time    WBC 3.9 2021 09:53 AM    HGB 13.4 2021 09:53 AM    HCT 41.6 2021 09:53 AM    PLATELET 280  09:53 AM    MCV 94.1 2021 09:53 AM     Lab Results   Component Value Date/Time    Cholesterol, total 176 2021 09:53 AM    HDL Cholesterol 65 2021 09:53 AM    LDL, calculated 86.6 2021 09:53 AM    Triglyceride 122 2021 09:53 AM    CHOL/HDL Ratio 2.7 2021 09:53 AM     Lab Results   Component Value Date/Time    GFR est non-AA >60 2021 09:53 AM    GFR est AA >60 2021 09:53 AM    Creatinine 0.74 2021 09:53 AM    BUN 18 09/28/2021 09:53 AM    Sodium 139 09/28/2021 09:53 AM    Potassium 3.7 09/28/2021 09:53 AM    Chloride 106 09/28/2021 09:53 AM    CO2 29 09/28/2021 09:53 AM        Review of Systems   Respiratory: Negative for shortness of breath. Cardiovascular: Negative for chest pain. Skin: Positive for rash. Rash under breast       Physical Exam  Constitutional:       General: She is not in acute distress. Appearance: Normal appearance. She is not ill-appearing, toxic-appearing or diaphoretic. HENT:      Head: Normocephalic and atraumatic. Right Ear: External ear normal.      Left Ear: External ear normal.   Eyes:      General:         Right eye: No discharge. Left eye: No discharge. Conjunctiva/sclera: Conjunctivae normal.      Pupils: Pupils are equal, round, and reactive to light. Cardiovascular:      Rate and Rhythm: Normal rate and regular rhythm. Heart sounds: No murmur heard. No friction rub. No gallop. Pulmonary:      Effort: No respiratory distress. Breath sounds: Normal breath sounds. No wheezing or rales. Chest:      Chest wall: No tenderness. Musculoskeletal:         General: Normal range of motion. Cervical back: Normal range of motion and neck supple. Skin:     General: Skin is warm and dry. Comments: Yeast infection under breast   Neurological:      Mental Status: She is alert and oriented to person, place, and time. Mental status is at baseline. Coordination: Coordination normal.      Gait: Gait normal.   Psychiatric:         Mood and Affect: Mood normal.         Behavior: Behavior normal.         ASSESSMENT and PLAN    ICD-10-CM ICD-9-CM    1. Rash     Candida    We will treat with ketoconazole cream and nystatin R21 782.1    2. Medicare annual wellness visit, subsequent  Z00.00 V70.0    3.  Malignant neoplasm of right female breast, unspecified estrogen receptor status, unspecified site of breast ([facility] [address]      In remission will see her in march  C50.911 174.9    4. Pure hyperglyceridemia     Cholesterol controlled on Lipitor E78.1 272.1    5. IFG (impaired fasting glucose)       Diet controlled monitor A1c     R73.01 790.21    6. Candida infection       Will treat with ketoconazole cream and nystatin for prevention after B37.9 112.9      Depression screen reviewed and negative     Scribed by Alexander Montemayor of [address] [place] [address] [address], as dictated by Dr. Shaji Zamudio. Current diagnosis and concerns discussed with pt at length. Pt understands risks and benefits or current treatment plan and medications, and accepts the treatment and medication with any possible risks. Pt asks appropriate questions, which were answered. Pt was instructed to call with any concerns or problems. I have reviewed the note documented by the scribe. The services provided are my own.   The documentation is accurate

## 2022-01-12 ENCOUNTER — TELEPHONE (OUTPATIENT)
Dept: INTERNAL MEDICINE CLINIC | Age: 77
End: 2022-01-12

## 2022-01-12 NOTE — TELEPHONE ENCOUNTER
States not sure how long she is supposed to take the cream and powder for rash.     Please call to clarify prescription instructions for:  ketoconazole cream and nystatin

## 2022-01-12 NOTE — TELEPHONE ENCOUNTER
Called, spoke with Pt. Two pt identifiers confirmed. Pt informed to us the medication until rash heals/go away. Pt stated ok that's all she wanted to know. Pt verbalized understanding of information discussed w/ no further questions at this time.

## 2022-09-06 ENCOUNTER — TRANSCRIBE ORDER (OUTPATIENT)
Dept: SCHEDULING | Age: 77
End: 2022-09-06

## 2022-09-06 DIAGNOSIS — Z12.31 SCREENING MAMMOGRAM FOR HIGH-RISK PATIENT: Primary | ICD-10-CM

## 2022-09-12 RX ORDER — ATORVASTATIN CALCIUM 40 MG/1
TABLET, FILM COATED ORAL
Qty: 90 TABLET | Refills: 0 | Status: SHIPPED | OUTPATIENT
Start: 2022-09-12

## 2022-09-12 NOTE — TELEPHONE ENCOUNTER
Future Appointments:  Future Appointments   Date Time Provider Arielle Kovacs   10/3/2022  2:00 PM [facility] 1 [facility]   10/5/2022  1:00 PM Mayra Schulte MD [facility] BS AMB        Last Appointment With Me:  Visit date not found     Requested Prescriptions     Pending Prescriptions Disp Refills    atorvastatin (LIPITOR) 40 mg tablet 90 Tablet 3     Si Tablet daily.

## 2022-09-28 ENCOUNTER — APPOINTMENT (OUTPATIENT)
Dept: INTERNAL MEDICINE CLINIC | Age: 77
End: 2022-09-28

## 2022-09-28 DIAGNOSIS — Z00.00 MEDICARE ANNUAL WELLNESS VISIT, SUBSEQUENT: Primary | ICD-10-CM

## 2022-09-28 DIAGNOSIS — Z00.00 MEDICARE ANNUAL WELLNESS VISIT, SUBSEQUENT: ICD-10-CM

## 2022-09-29 LAB
ALBUMIN SERPL-MCNC: 4.4 G/DL (ref 3.5–5)
ALBUMIN/GLOB SERPL: 1.4 {RATIO} (ref 1.1–2.2)
ALP SERPL-CCNC: 96 U/L (ref 45–117)
ALT SERPL-CCNC: 39 U/L (ref 12–78)
ANION GAP SERPL CALC-SCNC: 1 MMOL/L (ref 5–15)
AST SERPL-CCNC: 33 U/L (ref 15–37)
BILIRUB SERPL-MCNC: 0.9 MG/DL (ref 0.2–1)
BUN SERPL-MCNC: 17 MG/DL (ref 6–20)
BUN/CREAT SERPL: 21 (ref 12–20)
CALCIUM SERPL-MCNC: 9.6 MG/DL (ref 8.5–10.1)
CHLORIDE SERPL-SCNC: 106 MMOL/L (ref 97–108)
CHOLEST SERPL-MCNC: 203 MG/DL
CO2 SERPL-SCNC: 32 MMOL/L (ref 21–32)
CREAT SERPL-MCNC: 0.8 MG/DL (ref 0.55–1.02)
ERYTHROCYTE [DISTWIDTH] IN BLOOD BY AUTOMATED COUNT: 12.8 % (ref 11.5–14.5)
EST. AVERAGE GLUCOSE BLD GHB EST-MCNC: 114 MG/DL
GLOBULIN SER CALC-MCNC: 3.1 G/DL (ref 2–4)
GLUCOSE SERPL-MCNC: 116 MG/DL (ref 65–100)
HBA1C MFR BLD: 5.6 % (ref 4–5.6)
HCT VFR BLD AUTO: 44.7 % (ref 35–47)
HDLC SERPL-MCNC: 70 MG/DL
HDLC SERPL: 2.9 {RATIO} (ref 0–5)
HGB BLD-MCNC: 14.6 G/DL (ref 11.5–16)
LDLC SERPL CALC-MCNC: 106.8 MG/DL (ref 0–100)
MCH RBC QN AUTO: 30.8 PG (ref 26–34)
MCHC RBC AUTO-ENTMCNC: 32.7 G/DL (ref 30–36.5)
MCV RBC AUTO: 94.3 FL (ref 80–99)
NRBC # BLD: 0 K/UL (ref 0–0.01)
NRBC BLD-RTO: 0 PER 100 WBC
PLATELET # BLD AUTO: 166 K/UL (ref 150–400)
PMV BLD AUTO: 10.6 FL (ref 8.9–12.9)
POTASSIUM SERPL-SCNC: 4 MMOL/L (ref 3.5–5.1)
PROT SERPL-MCNC: 7.5 G/DL (ref 6.4–8.2)
RBC # BLD AUTO: 4.74 M/UL (ref 3.8–5.2)
SODIUM SERPL-SCNC: 139 MMOL/L (ref 136–145)
TRIGL SERPL-MCNC: 131 MG/DL (ref ?–150)
TSH SERPL DL<=0.05 MIU/L-ACNC: 1.7 UIU/ML (ref 0.36–3.74)
VLDLC SERPL CALC-MCNC: 26.2 MG/DL
WBC # BLD AUTO: 4.3 K/UL (ref 3.6–11)

## 2022-10-03 ENCOUNTER — HOSPITAL ENCOUNTER (OUTPATIENT)
Dept: MAMMOGRAPHY | Age: 77
Discharge: HOME | End: 2022-10-03
Attending: INTERNAL MEDICINE
Payer: MEDICARE

## 2022-10-03 DIAGNOSIS — Z12.31 SCREENING MAMMOGRAM FOR HIGH-RISK PATIENT: ICD-10-CM

## 2022-10-03 PROCEDURE — 77067 SCR MAMMO BI INCL CAD: CPT

## 2022-10-04 NOTE — PROGRESS NOTES
HISTORY OF PRESENT ILLNESS  Frederick Rudolph is a 78 y.o. female. HPI  Last here 1/11/22. Pt is here for routine care. Pt c/o pressure and a \"crunchy\" nose in L ear. States she has tried using debrox that she was given previously. Discussed we cannot do an ear flush today, but she can come in tomorrow for this. Discussed she can continue taking debrox. BP today is 115/70     Wt today is 120 lbs, up 4 lbs since lov  Her weight is within normal ranges     Reviewed labs     Lov pt c/o occasional leg cramps. States they occasionally wake her up at night. Discussed these sound idiopathic, recommended stretching. She saw Dr. Lee Aragon (rheum) for osteoarthritis in hand  She was told that she could use voltaren gel     Pt follows with Dr. Taras Favre (onco) for h/o DCIS  Pt follows annually with her in March  Last visit 3/22  Pt was previously on femara, no longer on this       Pt follows with Dr. Gabby Jimenez (uro-gyn) for leaky bladder and uterine prolapse  Pt had a bilateral oophorectomy and mesh placement 10/19/17  Of note, pt had a hysterectomy in the 1970s   Not needed to f/u recently      Previously provided referral for surg for lipoma   Pt had this lipoma excised 11/26/18 with Dr Perla Delgado      Pt follows with Dr Tisha Melgar (derm)  Last visit was 10/18     Taking lipitor 40mg daily      Pt is currently taking krill oil      Continues on vit D + ca       Pt is on zyrtec qAM   No longer taking hydroxyzine PRN     Pt lives with her        ACP not on file. SDM is her  Geraldine Barboza).   Provided information in the past.       Reviewed mammo 10/3/22: benign    PREVENTIVE:  AAA: no fmhx, not needed  Colonoscopy: 2/04/13, Dr. Sarina Richard, likely 10 year repeat, does not specify on report, gave info agan, advised pt to contact them   Pap: Dr. Gabby Jimenez, 4/17, hysterectomy in 1970s, declines further  Mammogram: 10/3/22, benign  Dexa: declines   Tdap: declines  Pneumovax: 9/20/16  [id number]: 3/16/17  Zostavax: 10/26/12  Shingrix: 1st round completed , 2nd round completed   Flu shot: 10/05/21, will get today 10/5/22  Hep C Screen: 3/17, negative  A1C:   5.7 10/20 5.6  5.5  5.6  Eye exam: Dr. Caitlyn Goodman , due reminded   Lipids: 10/22   EK17, nsr  Covid: three doses [employer])       Patient Active Problem List    Diagnosis Date Noted    Pure hyperglyceridemia 2016    Prediabetes 2016    Breast cancer ([facility] [address] 2015    Personal history of colonic polyps 2013     Current Outpatient Medications   Medication Sig Dispense Refill    atorvastatin (LIPITOR) 40 mg tablet TAKE 1 TABLET DAILY 90 Tablet 0    ketoconazole (NIZORAL) 2 % topical cream Apply  to affected area daily. 15 g 0    nystatin (MYCOSTATIN) powder Apply  to affected area four (4) times daily. 1 Each 1    B.infantis-B.ani-B.long-B.bifi (Probiotic 4X) 10-15 mg TbEC Take  by mouth. cetirizine (ZYRTEC) 10 mg tablet       krill-omega-3-dha-epa-lipids (KRILL OIL) 408-39-07-50 mg cap Take 1 Cap by mouth daily. multivitamin (ONE A DAY) tablet Take 1 Tab by mouth daily.          Past Surgical History:   Procedure Laterality Date    HX BREAST BIOPSY Right     HX BREAST LUMPECTOMY Right 2006    no radiation or chemo    HX GYN      hysterectomy 70's    HX PREMALIG/BENIGN SKIN LESION EXCISION      Excison soft tissue mass of back- Delia Schwab, MD    HX UROLOGICAL  10/19/2017    bladder prolapse    [place] BREAST SURGERY PROCEDURE UNLISTED      right lumpectomy      Lab Results   Component Value Date/Time    WBC 4.3 2022 08:49 AM    HGB 14.6 2022 08:49 AM    HCT 44.7 2022 08:49 AM    PLATELET 706  08:49 AM    MCV 94.3 2022 08:49 AM     Lab Results   Component Value Date/Time    Cholesterol, total 203 (H) 2022 08:49 AM    HDL Cholesterol 70 2022 08:49 AM    LDL, calculated 106.8 (H) 2022 08:49 AM    Triglyceride 131 2022 08:49 AM    CHOL/HDL Ratio 2.9 09/28/2022 08:49 AM     Lab Results   Component Value Date/Time    GFR est non-AA >60 09/28/2022 08:49 AM    GFR est AA >60 09/28/2022 08:49 AM    Creatinine 0.80 09/28/2022 08:49 AM    BUN 17 09/28/2022 08:49 AM    Sodium 139 09/28/2022 08:49 AM    Potassium 4.0 09/28/2022 08:49 AM    Chloride 106 09/28/2022 08:49 AM    CO2 32 09/28/2022 08:49 AM        Review of Systems   Respiratory:  Negative for shortness of breath. Cardiovascular:  Negative for chest pain. Physical Exam  Constitutional:       General: She is not in acute distress. Appearance: She is not ill-appearing, toxic-appearing or diaphoretic. HENT:      Head: Normocephalic and atraumatic. Right Ear: External ear normal. There is impacted cerumen. Left Ear: External ear normal.      Ears:      Comments: Mild cerumen on L ear  Eyes:      General:         Right eye: No discharge. Left eye: No discharge. Conjunctiva/sclera: Conjunctivae normal.      Pupils: Pupils are equal, round, and reactive to light. Neck:      Vascular: No carotid bruit. Cardiovascular:      Rate and Rhythm: Normal rate and regular rhythm. Heart sounds: No murmur heard. No friction rub. No gallop. Pulmonary:      Effort: No respiratory distress. Breath sounds: Normal breath sounds. No wheezing or rales. Chest:      Chest wall: No tenderness. Musculoskeletal:         General: Normal range of motion. Cervical back: Normal.   Skin:     General: Skin is warm and dry. Neurological:      Mental Status: She is oriented to person, place, and time. Mental status is at baseline. Coordination: Coordination normal.      Gait: Gait normal.   Psychiatric:         Mood and Affect: Mood normal.         Behavior: Behavior normal.       ASSESSMENT and PLAN    ICD-10-CM ICD-9-CM    1. Mixed hyperlipidemia  E78.2 272.2    Near goal on Lipitor 40 mg continue current dose no change   2.  Colon cancer screening  Z12.11 V76.51 REFERRAL TO GASTROENTEROLOGY   Due in February placed referral discussed   3. Needs flu shot  Z23 V04.81 INFLUENZA, FLUZONE HIGH-DOSE, (AGE 65 Y+), IM, PF, 0.7 ML      4. Malignant neoplasm of right female breast, unspecified estrogen receptor status, unspecified site of breast ([facility] [address]  C50.911 174.9    In remission up-to-date with hematology and mammogram   5. IFG (impaired fasting glucose)  R73.01 790.21    Diet controlled   6. Bilateral impacted cerumen  H61.23 380.4    Return for ear flush tomorrow     Depression screen reviewed and negative. Scribed by Carol Cowan, as dictated by Dr. Laurel Spain. Current diagnosis and concerns discussed with pt at length. Pt understands risks and benefits or current treatment plan and medications, and accepts the treatment and medication with any possible risks. Pt asks appropriate questions, which were answered. Pt was instructed to call with any concerns or problems. I have reviewed the note documented by the scribe. The services provided are my own. The documentation is accurate.

## 2022-10-05 ENCOUNTER — OFFICE VISIT (OUTPATIENT)
Dept: INTERNAL MEDICINE CLINIC | Age: 77
End: 2022-10-05
Payer: MEDICARE

## 2022-10-05 VITALS
HEART RATE: 97 BPM | WEIGHT: 120.2 LBS | HEIGHT: 62 IN | OXYGEN SATURATION: 97 % | DIASTOLIC BLOOD PRESSURE: 70 MMHG | SYSTOLIC BLOOD PRESSURE: 115 MMHG | TEMPERATURE: 98.1 F | BODY MASS INDEX: 22.12 KG/M2 | RESPIRATION RATE: 18 BRPM

## 2022-10-05 DIAGNOSIS — C50.911 MALIGNANT NEOPLASM OF RIGHT FEMALE BREAST, UNSPECIFIED ESTROGEN RECEPTOR STATUS, UNSPECIFIED SITE OF BREAST (HCC): ICD-10-CM

## 2022-10-05 DIAGNOSIS — E78.2 MIXED HYPERLIPIDEMIA: Primary | ICD-10-CM

## 2022-10-05 DIAGNOSIS — Z12.11 COLON CANCER SCREENING: ICD-10-CM

## 2022-10-05 DIAGNOSIS — H61.23 BILATERAL IMPACTED CERUMEN: ICD-10-CM

## 2022-10-05 DIAGNOSIS — Z23 NEEDS FLU SHOT: ICD-10-CM

## 2022-10-05 DIAGNOSIS — R73.01 IFG (IMPAIRED FASTING GLUCOSE): ICD-10-CM

## 2022-10-05 PROCEDURE — 1090F PRES/ABSN URINE INCON ASSESS: CPT | Performed by: INTERNAL MEDICINE

## 2022-10-05 PROCEDURE — 3017F COLORECTAL CA SCREEN DOC REV: CPT | Performed by: INTERNAL MEDICINE

## 2022-10-05 PROCEDURE — G8536 NO DOC ELDER MAL SCRN: HCPCS | Performed by: INTERNAL MEDICINE

## 2022-10-05 PROCEDURE — G8427 DOCREV CUR MEDS BY ELIG CLIN: HCPCS | Performed by: INTERNAL MEDICINE

## 2022-10-05 PROCEDURE — 99213 OFFICE O/P EST LOW 20 MIN: CPT | Performed by: INTERNAL MEDICINE

## 2022-10-05 PROCEDURE — G0463 HOSPITAL OUTPT CLINIC VISIT: HCPCS | Performed by: INTERNAL MEDICINE

## 2022-10-05 PROCEDURE — G8400 PT W/DXA NO RESULTS DOC: HCPCS | Performed by: INTERNAL MEDICINE

## 2022-10-05 PROCEDURE — G8510 SCR DEP NEG, NO PLAN REQD: HCPCS | Performed by: INTERNAL MEDICINE

## 2022-10-05 PROCEDURE — G8420 CALC BMI NORM PARAMETERS: HCPCS | Performed by: INTERNAL MEDICINE

## 2022-10-05 PROCEDURE — 1101F PT FALLS ASSESS-DOCD LE1/YR: CPT | Performed by: INTERNAL MEDICINE

## 2022-10-05 PROCEDURE — 90694 VACC AIIV4 NO PRSRV 0.5ML IM: CPT | Performed by: INTERNAL MEDICINE

## 2022-10-05 NOTE — PATIENT INSTRUCTIONS
Vaccine Information Statement    Influenza (Flu) Vaccine (Inactivated or Recombinant): What You Need to Know    Many vaccine information statements are available in Georgian and other languages. See www.immunize.org/vis. Hojas de información sobre vacunas están disponibles en español y en muchos otros idiomas. Visite www.immunize.org/vis. 1. Why get vaccinated? Influenza vaccine can prevent influenza (flu). Flu is a contagious disease that spreads around the [facility] every year, usually between October and May. Anyone can get the flu, but it is more dangerous for some people. Infants and young children, people 72 years and older, pregnant people, and people with certain health conditions or a weakened immune system are at greatest risk of flu complications. Pneumonia, bronchitis, sinus infections, and ear infections are examples of flu-related complications. If you have a medical condition, such as heart disease, cancer, or diabetes, flu can make it worse. Flu can cause fever and chills, sore throat, muscle aches, fatigue, cough, headache, and runny or stuffy nose. Some people may have vomiting and diarrhea, though this is more common in children than adults. In an average year, thousands of people in the [facility] die from flu, and many more are hospitalized. Flu vaccine prevents millions of illnesses and flu-related visits to the doctor each year. 2. Influenza vaccines     CDC recommends everyone 6 months and older get vaccinated every flu season. Children 6 months through 6years of age may need 2 doses during a single flu season. Everyone else needs only 1 dose each flu season. It takes about 2 weeks for protection to develop after vaccination. There are many flu viruses, and they are always changing. Each year a new flu vaccine is made to protect against the influenza viruses believed to be likely to cause disease in the upcoming flu season.  Even when the vaccine doesnt exactly match these viruses, it may still provide some protection. Influenza vaccine does not cause flu. Influenza vaccine may be given at the same time as other vaccines. 3. Talk with your health care provider    Tell your vaccination provider if the person getting the vaccine:  Has had an allergic reaction after a previous dose of influenza vaccine, or has any severe, life-threatening allergies   Has ever had Guillain-Barré Syndrome (also called GBS)    In some cases, your health care provider may decide to postpone influenza vaccination until a future visit. Influenza vaccine can be administered at any time during pregnancy. People who are or will be pregnant during influenza season should receive inactivated influenza vaccine. People with minor illnesses, such as a cold, may be vaccinated. People who are moderately or severely ill should usually wait until they recover before getting influenza vaccine. Your health care provider can give you more information. 4. Risks of a vaccine reaction    Soreness, redness, and swelling where the shot is given, fever, muscle aches, and headache can happen after influenza vaccination. There may be a very small increased risk of Guillain-Barré Syndrome (GBS) after inactivated influenza vaccine (the flu shot). Tian Valladares children who get the flu shot along with pneumococcal vaccine (PCV13) and/or DTaP vaccine at the same time might be slightly more likely to have a seizure caused by fever. Tell your health care provider if a child who is getting flu vaccine has ever had a seizure. People sometimes faint after medical procedures, including vaccination. Tell your provider if you feel dizzy or have vision changes or ringing in the ears. As with any medicine, there is a very remote chance of a vaccine causing a severe allergic reaction, other serious injury, or death. 5. What if there is a serious problem?     An allergic reaction could occur after the vaccinated person leaves the clinic. If you see signs of a severe allergic reaction (hives, swelling of the face and throat, difficulty breathing, a fast heartbeat, dizziness, or weakness), call 9-1-1 and get the person to the nearest hospital.    For other signs that concern you, call your health care provider. Adverse reactions should be reported to the Vaccine Adverse Event Reporting System (VAERS). Your health care provider will usually file this report, or you can do it yourself. Visit the VAERS website at www.vaers. [facility].gov or call 9-506.313.6209. VAERS is only for reporting reactions, and VAERS staff members do not give medical advice. 6. The National Vaccine Injury Compensation Program    The [facility] Vaccine Injury Compensation Program (VICP) is a federal program that was created to compensate people who may have been injured by certain vaccines. Claims regarding alleged injury or death due to vaccination have a time limit for filing, which may be as short as two years. Visit the VICP website at www.[facility].gov/vaccinecompensation or call 9-155.282.7021 to learn about the program and about filing a claim. 7. How can I learn more? Ask your health care provider. Call your local or state health department. Visit the website of the Food and Drug Administration (FDA) for vaccine package inserts and additional information at www.fda.gov/vaccines-blood-biologics/vaccines. Contact the Centers for Disease Control and Prevention (CDC): Call 3-361.131.4027 (1-800-CDC-INFO) or  Visit CDCs influenza website at www.cdc.gov/flu. Vaccine Information Statement   Inactivated Influenza Vaccine   8/6/2021  42 BARB Sin 225LT-28   Department of Health and Human Services  Centers for Disease Control and Prevention    Office Use Only

## 2022-10-05 NOTE — PROGRESS NOTES
Ollie Chin is a 78 y.o. female  HIPAA verified by two patient identifiers. Health Maintenance Due   Topic    COVID-19 Vaccine (4 - Booster for Get10 series)    Flu Vaccine (1)     Chief Complaint   Patient presents with    Complete Physical     Visit Vitals  /70   Pulse 97   Temp 98.1 °F (36.7 °C) (Temporal)   Resp 18   Ht 5' 2\" (1.575 m)   Wt 120 lb 3.2 oz (54.5 kg)   SpO2 97%   BMI 21.98 kg/m²       Pain Scale: 0 - No pain/10  Pain Location:   1. Have you been to the ER, urgent care clinic since your last visit? Hospitalized since your last visit? No    2. Have you seen or consulted any other health care providers outside of the [address] [place] [address] since your last visit? Include any pap smears or colon screening.  No

## 2022-10-06 ENCOUNTER — CLINICAL SUPPORT (OUTPATIENT)
Dept: INTERNAL MEDICINE CLINIC | Age: 77
End: 2022-10-06
Payer: MEDICARE

## 2022-10-06 VITALS — SYSTOLIC BLOOD PRESSURE: 133 MMHG | TEMPERATURE: 96.9 F | DIASTOLIC BLOOD PRESSURE: 65 MMHG | HEART RATE: 77 BPM

## 2022-10-06 DIAGNOSIS — H61.23 BILATERAL IMPACTED CERUMEN: Primary | ICD-10-CM

## 2022-10-06 PROCEDURE — 99211 OFF/OP EST MAY X REQ PHY/QHP: CPT | Performed by: INTERNAL MEDICINE

## 2022-10-06 NOTE — PROGRESS NOTES
Identified pt with two pt identifiers(name and ). Reviewed record in preparation for visit and have obtained necessary documentation. Chief Complaint   Patient presents with    Ear Fullness        Vitals:    10/06/22 1105   BP: 133/65   Pulse: 77   Temp: 96.9 °F (36.1 °C)   TempSrc: Temporal   PainSc:   0 - No pain       The patient reported she had ear wax in both ears, she is not able to hear. On exam she had bilateral ear cerumen. Both ears were cleaned  using room temperature water, peroxide and ear lavage equipment. This writer gently flushed the ear canal until the ear cerumen was clear. I advised her, to prevent ear cerumen she can purchase OTC  Debrox, she should use as directed. The patient denied complains of dizziness, nausea, or pain. Pt verbalized understanding of information discussed w/ no further questions at this time.       Klaudia Mosley, [address] [facility]  [address] [facility], [address] [place] [address]  W: [phone]  F: [phone]

## 2022-11-09 ENCOUNTER — TELEPHONE (OUTPATIENT)
Dept: INTERNAL MEDICINE CLINIC | Age: 77
End: 2022-11-09

## 2022-11-09 DIAGNOSIS — Z12.11 COLON CANCER SCREENING: Primary | ICD-10-CM

## 2022-11-09 NOTE — TELEPHONE ENCOUNTER
Called, spoke to pt  Received two pt identifiers  Gave pt referral info for Dr. Jacques Benson) for colo  Pt verbalizes understanding of the instructions and has no further questions at this time.

## 2022-11-09 NOTE — TELEPHONE ENCOUNTER
----- Message from Marianela Quezada sent at 11/8/2022  1:59 PM EST -----  Subject: Referral Request    Reason for referral request? Pt is requesting a referral for a colonoscopy   that is closer/in [place] and would like a callback. Provider patient wants to be referred to(if known):     Provider Phone Number(if known):     Additional Information for Provider?   ---------------------------------------------------------------------------  --------------  [id number]    [phone]; OK to leave message on voicemail  ---------------------------------------------------------------------------  --------------

## 2022-11-25 RX ORDER — ATORVASTATIN CALCIUM 40 MG/1
TABLET, FILM COATED ORAL
Qty: 90 TABLET | Refills: 0 | Status: SHIPPED | OUTPATIENT
Start: 2022-11-25

## 2022-11-25 NOTE — TELEPHONE ENCOUNTER
PCP: Suzanne Sorenson MD    Last appt: 10/6/2022  Future Appointments   Date Time Provider rAielle Kovacs   10/9/2023 12:00 PM Suzanne Sorenson MD [facility] BS AMB       Requested Prescriptions     Pending Prescriptions Disp Refills    atorvastatin (LIPITOR) 40 mg tablet 90 Tablet 0     Sig: TAKE 1 TABLET DAILY

## 2023-03-02 RX ORDER — ATORVASTATIN CALCIUM 40 MG/1
TABLET, FILM COATED ORAL
Qty: 90 TABLET | Refills: 1 | Status: SHIPPED | OUTPATIENT
Start: 2023-03-02

## 2023-03-02 NOTE — TELEPHONE ENCOUNTER
Future Appointments:  Future Appointments   Date Time Provider Arielle Bethanie   10/9/2023 12:00 PM Juide Garcia MD [facility] BS AMB        Last Appointment With Me:  10/5/2022     Requested Prescriptions     Pending Prescriptions Disp Refills    atorvastatin (LIPITOR) 40 mg tablet 90 Tablet 1     Sig: TAKE 1 TABLET DAILY

## 2023-05-02 RX ORDER — CHROMIUM PICOLINATE 200 MCG
1 TABLET ORAL DAILY
COMMUNITY

## 2023-05-03 ENCOUNTER — HOSPITAL ENCOUNTER (OUTPATIENT)
Age: 78
Setting detail: OUTPATIENT SURGERY
Discharge: HOME | End: 2023-05-03
Attending: SPECIALIST | Admitting: SPECIALIST
Payer: MEDICARE

## 2023-05-03 ENCOUNTER — ANESTHESIA (OUTPATIENT)
Dept: ENDOSCOPY | Age: 78
End: 2023-05-03
Payer: MEDICARE

## 2023-05-03 ENCOUNTER — ANESTHESIA EVENT (OUTPATIENT)
Dept: ENDOSCOPY | Age: 78
End: 2023-05-03
Payer: MEDICARE

## 2023-05-03 VITALS
SYSTOLIC BLOOD PRESSURE: 118 MMHG | DIASTOLIC BLOOD PRESSURE: 66 MMHG | RESPIRATION RATE: 23 BRPM | TEMPERATURE: 98.2 F | WEIGHT: 123 LBS | BODY MASS INDEX: 22.63 KG/M2 | HEART RATE: 74 BPM | OXYGEN SATURATION: 95 % | HEIGHT: 62 IN

## 2023-05-03 PROCEDURE — 76040000019: Performed by: SPECIALIST

## 2023-05-03 PROCEDURE — 74011000250 HC RX REV CODE- 250: Performed by: ANESTHESIOLOGY

## 2023-05-03 PROCEDURE — 77030013992 HC SNR POLYP ENDOSC BSC -B: Performed by: SPECIALIST

## 2023-05-03 PROCEDURE — 2709999900 HC NON-CHARGEABLE SUPPLY: Performed by: SPECIALIST

## 2023-05-03 PROCEDURE — 74011250636 HC RX REV CODE- 250/636: Performed by: ANESTHESIOLOGY

## 2023-05-03 PROCEDURE — 74011250636 HC RX REV CODE- 250/636: Performed by: SPECIALIST

## 2023-05-03 PROCEDURE — 88305 TISSUE EXAM BY PATHOLOGIST: CPT

## 2023-05-03 PROCEDURE — 76060000031 HC ANESTHESIA FIRST 0.5 HR: Performed by: SPECIALIST

## 2023-05-03 RX ORDER — SODIUM CHLORIDE 9 MG/ML
50 INJECTION, SOLUTION INTRAVENOUS CONTINUOUS
Status: DISCONTINUED | OUTPATIENT
Start: 2023-05-03 | End: 2023-05-03 | Stop reason: HOSPADM

## 2023-05-03 RX ORDER — LIDOCAINE HYDROCHLORIDE 20 MG/ML
INJECTION, SOLUTION EPIDURAL; INFILTRATION; INTRACAUDAL; PERINEURAL AS NEEDED
Status: DISCONTINUED | OUTPATIENT
Start: 2023-05-03 | End: 2023-05-03 | Stop reason: HOSPADM

## 2023-05-03 RX ORDER — PROPOFOL 10 MG/ML
INJECTION, EMULSION INTRAVENOUS AS NEEDED
Status: DISCONTINUED | OUTPATIENT
Start: 2023-05-03 | End: 2023-05-03 | Stop reason: HOSPADM

## 2023-05-03 RX ORDER — DEXTROMETHORPHAN/PSEUDOEPHED 2.5-7.5/.8
1.2 DROPS ORAL
Status: DISCONTINUED | OUTPATIENT
Start: 2023-05-03 | End: 2023-05-03 | Stop reason: HOSPADM

## 2023-05-03 RX ORDER — SODIUM CHLORIDE 0.9 % (FLUSH) 0.9 %
5-40 SYRINGE (ML) INJECTION AS NEEDED
Status: DISCONTINUED | OUTPATIENT
Start: 2023-05-03 | End: 2023-05-03 | Stop reason: HOSPADM

## 2023-05-03 RX ORDER — SODIUM CHLORIDE 0.9 % (FLUSH) 0.9 %
5-40 SYRINGE (ML) INJECTION EVERY 8 HOURS
Status: DISCONTINUED | OUTPATIENT
Start: 2023-05-03 | End: 2023-05-03 | Stop reason: HOSPADM

## 2023-05-03 RX ADMIN — SODIUM CHLORIDE 50 ML/HR: 9 INJECTION, SOLUTION INTRAVENOUS at 07:25

## 2023-05-03 RX ADMIN — PROPOFOL 200 MG: 10 INJECTION, EMULSION INTRAVENOUS at 07:49

## 2023-05-03 RX ADMIN — LIDOCAINE HYDROCHLORIDE 20 MG: 20 INJECTION, SOLUTION EPIDURAL; INFILTRATION; INTRACAUDAL; PERINEURAL at 07:28

## 2023-08-23 RX ORDER — ATORVASTATIN CALCIUM 40 MG/1
40 TABLET, FILM COATED ORAL DAILY
Qty: 90 TABLET | Refills: 0 | Status: SHIPPED | OUTPATIENT
Start: 2023-08-23

## 2023-08-23 NOTE — TELEPHONE ENCOUNTER
PCP: Brittany Calderón MD    Last appt: 10/5/2022   Future Appointments   Date Time Provider [address]   10/9/2023 12:00 PM Elizabeth Diez MD [facility] BS AMB       Requested Prescriptions     Pending Prescriptions Disp Refills    atorvastatin (LIPITOR) 40 MG tablet 90 tablet 0     Sig: Take 1 tablet by mouth daily

## 2023-09-29 ENCOUNTER — OFFICE VISIT (OUTPATIENT)
Age: 78
End: 2023-09-29
Payer: MEDICARE

## 2023-09-29 VITALS
SYSTOLIC BLOOD PRESSURE: 116 MMHG | HEIGHT: 62 IN | RESPIRATION RATE: 12 BRPM | WEIGHT: 123.2 LBS | DIASTOLIC BLOOD PRESSURE: 69 MMHG | OXYGEN SATURATION: 95 % | BODY MASS INDEX: 22.67 KG/M2 | HEART RATE: 86 BPM | TEMPERATURE: 97 F

## 2023-09-29 DIAGNOSIS — M19.072 PRIMARY OSTEOARTHRITIS OF LEFT FOOT: Primary | ICD-10-CM

## 2023-09-29 PROCEDURE — G8420 CALC BMI NORM PARAMETERS: HCPCS | Performed by: STUDENT IN AN ORGANIZED HEALTH CARE EDUCATION/TRAINING PROGRAM

## 2023-09-29 PROCEDURE — 99213 OFFICE O/P EST LOW 20 MIN: CPT | Performed by: STUDENT IN AN ORGANIZED HEALTH CARE EDUCATION/TRAINING PROGRAM

## 2023-09-29 PROCEDURE — 1123F ACP DISCUSS/DSCN MKR DOCD: CPT | Performed by: STUDENT IN AN ORGANIZED HEALTH CARE EDUCATION/TRAINING PROGRAM

## 2023-09-29 PROCEDURE — 1036F TOBACCO NON-USER: CPT | Performed by: STUDENT IN AN ORGANIZED HEALTH CARE EDUCATION/TRAINING PROGRAM

## 2023-09-29 PROCEDURE — 1090F PRES/ABSN URINE INCON ASSESS: CPT | Performed by: STUDENT IN AN ORGANIZED HEALTH CARE EDUCATION/TRAINING PROGRAM

## 2023-09-29 PROCEDURE — G8427 DOCREV CUR MEDS BY ELIG CLIN: HCPCS | Performed by: STUDENT IN AN ORGANIZED HEALTH CARE EDUCATION/TRAINING PROGRAM

## 2023-09-29 PROCEDURE — G8400 PT W/DXA NO RESULTS DOC: HCPCS | Performed by: STUDENT IN AN ORGANIZED HEALTH CARE EDUCATION/TRAINING PROGRAM

## 2023-09-29 SDOH — ECONOMIC STABILITY: FOOD INSECURITY: WITHIN THE PAST 12 MONTHS, THE FOOD YOU BOUGHT JUST DIDN'T LAST AND YOU DIDN'T HAVE MONEY TO GET MORE.: NEVER TRUE

## 2023-09-29 SDOH — ECONOMIC STABILITY: FOOD INSECURITY: WITHIN THE PAST 12 MONTHS, YOU WORRIED THAT YOUR FOOD WOULD RUN OUT BEFORE YOU GOT MONEY TO BUY MORE.: NEVER TRUE

## 2023-09-29 SDOH — ECONOMIC STABILITY: INCOME INSECURITY: HOW HARD IS IT FOR YOU TO PAY FOR THE VERY BASICS LIKE FOOD, HOUSING, MEDICAL CARE, AND HEATING?: NOT HARD AT ALL

## 2023-09-29 SDOH — ECONOMIC STABILITY: HOUSING INSECURITY
IN THE LAST 12 MONTHS, WAS THERE A TIME WHEN YOU DID NOT HAVE A STEADY PLACE TO SLEEP OR SLEPT IN A SHELTER (INCLUDING NOW)?: NO

## 2023-09-29 ASSESSMENT — PATIENT HEALTH QUESTIONNAIRE - PHQ9
SUM OF ALL RESPONSES TO PHQ9 QUESTIONS 1 & 2: 0
2. FEELING DOWN, DEPRESSED OR HOPELESS: 0
SUM OF ALL RESPONSES TO PHQ QUESTIONS 1-9: 0
1. LITTLE INTEREST OR PLEASURE IN DOING THINGS: 0
SUM OF ALL RESPONSES TO PHQ QUESTIONS 1-9: 0

## 2023-09-29 NOTE — PROGRESS NOTES
Chief Complaint   Patient presents with    Foot Pain     Left x 2 days        1. Have you been to the ER, urgent care clinic since your last visit? Hospitalized since your last visit? No    2. Have you seen or consulted any other health care providers outside of the [address] [place] [address] Avenue since your last visit? Include any pap smears or colon screening.  No

## 2023-09-29 NOTE — ASSESSMENT & PLAN NOTE
Tylenol, Icing intermittently, diclofenac gel, compression stockings. Pt adamant that she would not want injections or surgery, declines referral to ortho and PT.

## 2023-10-02 ASSESSMENT — ENCOUNTER SYMPTOMS: SHORTNESS OF BREATH: 0

## 2023-10-05 ENCOUNTER — HOSPITAL ENCOUNTER (OUTPATIENT)
Facility: HOSPITAL | Age: 78
Discharge: HOME | End: 2023-10-05
Attending: INTERNAL MEDICINE
Payer: MEDICARE

## 2023-10-05 DIAGNOSIS — Z12.31 SCREENING MAMMOGRAM FOR HIGH-RISK PATIENT: ICD-10-CM

## 2023-10-05 PROCEDURE — 77067 SCR MAMMO BI INCL CAD: CPT

## 2023-10-07 NOTE — PROGRESS NOTES
HISTORY OF PRESENT ILLNESS  Simran Martinez is a 70 y.o. female. HPI    Last here 10/5/22. Pt is here for routine care. BP today is 137/80  BP at /63     Wt today is 122 lbs, up 2 lbs since lov   Her weight is within normal ranges     Reviewed labs   Ordered labs, she has eaten today, will come back for labs    She saw Dr Mary Wilson 9/29/23 for foot pain, states this is resolved now  Reviewed note:  1. Primary osteoarthritis of left foot  Assessment & Plan:  Tylenol, Icing intermittently, diclofenac gel, compression stockings. Pt adamant that she would not want injections or surgery, declines referral to ortho and PT. Orders:  -     diclofenac sodium (VOLTAREN) 1 % GEL; Apply 2 g topically 4 times daily, Topical, 4 TIMES DAILY Starting Fri 9/29/2023, Disp-100 g, R-1, Normal      She saw Dr. 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 Karel Knight (uro-gyn) for leaky bladder and uterine prolapse  Pt had a bilateral oophorectomy and mesh placement 10/19/17  Of note, pt had a hysterectomy in the 1970s   Not needed to f/u recently      Previously provided referral for surg for lipoma   Pt had this lipoma excised 11/26/18 with Dr Toma Villaseñor           Taking lipitor 40mg daily      Pt is currently taking krill oil      She is taking metamucil fiber supplement    Continues on vit D + ca       Pt is on zyrtec qAM   No longer taking hydroxyzine PRN     Pt lives with her        ACP not on file. SDM is her  Dorina Gallo).   Provided information in the past.      Reviewed Mammogram 10/23, neg  Reviewed Colonoscopy 5/23, Dr. Ivan Freire 1 polyp, 5 yr repeat    PREVENTIVE:  AAA: no fmhx, not needed  Colonoscopy: 5/23, Dr. Ivan Freire 1 polyp, 5 yr repeat  Pap: Dr. Karel Knight, 4/17, hysterectomy in 1970s, declines further  Mammogram: 10/23,
(ZYRTEC) 10 MG tablet ceived the following from [facility]: Outside name: cetirizine (ZYRTEC) 10 mg tablet Yes Automatic Reconciliation, Ar   ketoconazole (NIZORAL) 2 % cream Apply topically daily  Automatic Reconciliation, Ar   nystatin (MYCOSTATIN) 069922 UNIT/GM powder Apply topically 4 times daily  Automatic Reconciliation, Ar       CareTeam (Including outside providers/suppliers regularly involved in providing care):   Patient Care Team:  Vy Zimmerman MD as PCP - Amy Saleem MD as PCP - Empaneled Provider  Renetta Lopez MD as Surgeon     Reviewed and updated this visit:  Allergies  Meds  Problems  Med Hx  Surg Hx  Soc Hx  Fam Hx       Pt lives with her        ACP not on file. SDM is her  Rudolph Garcia). Provided information in the past.   :  AAA: no fmhx, not needed  Colonoscopy: , Dr. Ever Schofield, 5 years   Pap: Dr. Merle Ann, , hysterectomy in 1970s, declines further  Mammogram: 10/323 annual   Dexa: declines     Tdap: declines  Pneumovax: 16  Ksazppq28: 3/16/17  Zostavax: 10/26/12  Shingrix: 1st round completed , 2nd round completed   Flu shot: 10/5/22 annual --today 10/23    Hep C Screen: 3/17, negative  A1C:   5.6    Eye exam: Dr. Yolanda Lauren , annual     Lipids:   annual     EK17, nsr  Covid: 4 doses (Pfizer)      Medication reconciliation completed by MA and reviewed by me. Medical/surgical/social/family history reviewed and updated by me. Patient provided AVS and preventative screening table. Patient verbalized understanding of all information discussed.

## 2023-10-09 ENCOUNTER — OFFICE VISIT (OUTPATIENT)
Age: 78
End: 2023-10-09
Payer: MEDICARE

## 2023-10-09 VITALS
HEIGHT: 62 IN | WEIGHT: 122.2 LBS | TEMPERATURE: 98.1 F | BODY MASS INDEX: 22.49 KG/M2 | RESPIRATION RATE: 15 BRPM | SYSTOLIC BLOOD PRESSURE: 137 MMHG | DIASTOLIC BLOOD PRESSURE: 80 MMHG

## 2023-10-09 DIAGNOSIS — Z00.00 MEDICARE ANNUAL WELLNESS VISIT, SUBSEQUENT: ICD-10-CM

## 2023-10-09 DIAGNOSIS — M19.072 PRIMARY OSTEOARTHRITIS OF LEFT FOOT: ICD-10-CM

## 2023-10-09 DIAGNOSIS — C50.919 MALIGNANT NEOPLASM OF FEMALE BREAST, UNSPECIFIED ESTROGEN RECEPTOR STATUS, UNSPECIFIED LATERALITY, UNSPECIFIED SITE OF BREAST (HCC): ICD-10-CM

## 2023-10-09 DIAGNOSIS — R73.01 IFG (IMPAIRED FASTING GLUCOSE): ICD-10-CM

## 2023-10-09 DIAGNOSIS — E78.1 PURE HYPERGLYCERIDEMIA: ICD-10-CM

## 2023-10-09 DIAGNOSIS — Z23 NEEDS FLU SHOT: ICD-10-CM

## 2023-10-09 DIAGNOSIS — C50.919 MALIGNANT NEOPLASM OF FEMALE BREAST, UNSPECIFIED ESTROGEN RECEPTOR STATUS, UNSPECIFIED LATERALITY, UNSPECIFIED SITE OF BREAST (HCC): Primary | ICD-10-CM

## 2023-10-09 PROCEDURE — 90694 VACC AIIV4 NO PRSRV 0.5ML IM: CPT | Performed by: INTERNAL MEDICINE

## 2023-10-09 PROCEDURE — 99213 OFFICE O/P EST LOW 20 MIN: CPT | Performed by: INTERNAL MEDICINE

## 2023-10-09 PROCEDURE — 1123F ACP DISCUSS/DSCN MKR DOCD: CPT | Performed by: INTERNAL MEDICINE

## 2023-10-09 PROCEDURE — 1090F PRES/ABSN URINE INCON ASSESS: CPT | Performed by: INTERNAL MEDICINE

## 2023-10-09 PROCEDURE — G0439 PPPS, SUBSEQ VISIT: HCPCS | Performed by: INTERNAL MEDICINE

## 2023-10-09 PROCEDURE — G8420 CALC BMI NORM PARAMETERS: HCPCS | Performed by: INTERNAL MEDICINE

## 2023-10-09 PROCEDURE — G8484 FLU IMMUNIZE NO ADMIN: HCPCS | Performed by: INTERNAL MEDICINE

## 2023-10-09 PROCEDURE — PBSHW INFLUENZA, FLUAD, (AGE 65 Y+), IM, PF, 0.5 ML: Performed by: INTERNAL MEDICINE

## 2023-10-09 PROCEDURE — G8400 PT W/DXA NO RESULTS DOC: HCPCS | Performed by: INTERNAL MEDICINE

## 2023-10-09 PROCEDURE — 1036F TOBACCO NON-USER: CPT | Performed by: INTERNAL MEDICINE

## 2023-10-09 PROCEDURE — G8427 DOCREV CUR MEDS BY ELIG CLIN: HCPCS | Performed by: INTERNAL MEDICINE

## 2023-10-09 ASSESSMENT — PATIENT HEALTH QUESTIONNAIRE - PHQ9
1. LITTLE INTEREST OR PLEASURE IN DOING THINGS: 0
SUM OF ALL RESPONSES TO PHQ QUESTIONS 1-9: 0
2. FEELING DOWN, DEPRESSED OR HOPELESS: 0
SUM OF ALL RESPONSES TO PHQ QUESTIONS 1-9: 0
SUM OF ALL RESPONSES TO PHQ QUESTIONS 1-9: 0
SUM OF ALL RESPONSES TO PHQ9 QUESTIONS 1 & 2: 0
SUM OF ALL RESPONSES TO PHQ QUESTIONS 1-9: 0

## 2023-10-09 ASSESSMENT — LIFESTYLE VARIABLES
HOW MANY STANDARD DRINKS CONTAINING ALCOHOL DO YOU HAVE ON A TYPICAL DAY: PATIENT DOES NOT DRINK
HOW OFTEN DO YOU HAVE A DRINK CONTAINING ALCOHOL: NEVER

## 2023-10-10 ENCOUNTER — NURSE ONLY (OUTPATIENT)
Age: 78
End: 2023-10-10

## 2023-10-10 DIAGNOSIS — E78.1 PURE HYPERGLYCERIDEMIA: ICD-10-CM

## 2023-10-10 DIAGNOSIS — R73.01 IFG (IMPAIRED FASTING GLUCOSE): ICD-10-CM

## 2023-10-10 DIAGNOSIS — C50.919 MALIGNANT NEOPLASM OF FEMALE BREAST, UNSPECIFIED ESTROGEN RECEPTOR STATUS, UNSPECIFIED LATERALITY, UNSPECIFIED SITE OF BREAST (HCC): ICD-10-CM

## 2023-10-10 DIAGNOSIS — E78.1 PURE HYPERGLYCERIDEMIA: Primary | ICD-10-CM

## 2023-10-10 LAB
ALBUMIN SERPL-MCNC: 4.1 G/DL (ref 3.5–5)
ALBUMIN/GLOB SERPL: 1.3 (ref 1.1–2.2)
ALP SERPL-CCNC: 92 U/L (ref 45–117)
ALT SERPL-CCNC: 31 U/L (ref 12–78)
ANION GAP SERPL CALC-SCNC: 5 MMOL/L (ref 5–15)
AST SERPL-CCNC: 17 U/L (ref 15–37)
BILIRUB SERPL-MCNC: 0.8 MG/DL (ref 0.2–1)
BUN SERPL-MCNC: 20 MG/DL (ref 6–20)
BUN/CREAT SERPL: 28 (ref 12–20)
CALCIUM SERPL-MCNC: 9.4 MG/DL (ref 8.5–10.1)
CHLORIDE SERPL-SCNC: 105 MMOL/L (ref 97–108)
CHOLEST SERPL-MCNC: 164 MG/DL
CO2 SERPL-SCNC: 30 MMOL/L (ref 21–32)
CREAT SERPL-MCNC: 0.71 MG/DL (ref 0.55–1.02)
ERYTHROCYTE [DISTWIDTH] IN BLOOD BY AUTOMATED COUNT: 12.5 % (ref 11.5–14.5)
EST. AVERAGE GLUCOSE BLD GHB EST-MCNC: 117 MG/DL
GLOBULIN SER CALC-MCNC: 3.1 G/DL (ref 2–4)
GLUCOSE SERPL-MCNC: 96 MG/DL (ref 65–100)
HBA1C MFR BLD: 5.7 % (ref 4–5.6)
HCT VFR BLD AUTO: 42.4 % (ref 35–47)
HDLC SERPL-MCNC: 64 MG/DL
HDLC SERPL: 2.6 (ref 0–5)
HGB BLD-MCNC: 14.1 G/DL (ref 11.5–16)
LDLC SERPL CALC-MCNC: 79.2 MG/DL (ref 0–100)
MCH RBC QN AUTO: 30.8 PG (ref 26–34)
MCHC RBC AUTO-ENTMCNC: 33.3 G/DL (ref 30–36.5)
MCV RBC AUTO: 92.6 FL (ref 80–99)
NRBC # BLD: 0 K/UL (ref 0–0.01)
NRBC BLD-RTO: 0 PER 100 WBC
PLATELET # BLD AUTO: 153 K/UL (ref 150–400)
PMV BLD AUTO: 11.3 FL (ref 8.9–12.9)
POTASSIUM SERPL-SCNC: 4.1 MMOL/L (ref 3.5–5.1)
PROT SERPL-MCNC: 7.2 G/DL (ref 6.4–8.2)
RBC # BLD AUTO: 4.58 M/UL (ref 3.8–5.2)
SODIUM SERPL-SCNC: 140 MMOL/L (ref 136–145)
TRIGL SERPL-MCNC: 104 MG/DL
TSH SERPL DL<=0.05 MIU/L-ACNC: 1.3 UIU/ML (ref 0.36–3.74)
VLDLC SERPL CALC-MCNC: 20.8 MG/DL
WBC # BLD AUTO: 5.2 K/UL (ref 3.6–11)

## 2023-10-17 ENCOUNTER — TELEPHONE (OUTPATIENT)
Age: 78
End: 2023-10-17

## 2023-10-17 NOTE — TELEPHONE ENCOUNTER
----- Message from Luciano Arvizu sent at 10/17/2023  9:48 AM EDT -----  Subject: Appointment Request    Reason for Call: Established Patient Appointment needed: Routine Medicare   AWV    QUESTIONS    Reason for appointment request? No appointments available during search     Additional Information for Provider? Pt called to reschedule appt for Oct   2024. Pt would like sooner appt. Pt has 915 appt. Provider does not have   schedule open past 10/16/24. Please advise. Pt would also like test   results from 10/10. Pt would like paper copy mailed to her.  Please advise   ---------------------------------------------------------------------------  --------------  Carmen CROWELL  [phone]; OK to leave message on voicemail  ---------------------------------------------------------------------------  --------------  SCRIPT ANSWERS

## 2023-10-17 NOTE — TELEPHONE ENCOUNTER
----- Message from Shan Goodson sent at 10/17/2023  2:43 PM EDT -----  Subject: Message to Provider    QUESTIONS  Information for Provider?  Pt is returning call from office regarding appt   request. Please contact to discuss as soon as possible.   ---------------------------------------------------------------------------  --------------  Alejandro Salado Suraj  [phone]; OK to leave message on voicemail  ---------------------------------------------------------------------------  --------------  SCRIPT ANSWERS  undefined

## 2023-11-29 RX ORDER — ATORVASTATIN CALCIUM 40 MG/1
40 TABLET, FILM COATED ORAL DAILY
Qty: 90 TABLET | Refills: 0 | Status: SHIPPED | OUTPATIENT
Start: 2023-11-29

## 2023-11-29 NOTE — TELEPHONE ENCOUNTER
Caller requests Refill of:  atorvastatin (LIPITOR) 40 MG tablet       Please send to:    [address] [facility], [address] [place] [address],[address] [phone] - F [phone]  [address] [place] [address] Drive [address]  Phone: [phone] Fax: [phone]         Visit / Appointment History:  Future Appointment at [facility]:  10/15/2024      Last Appointment With PCP:  10/9/2023       Caller confirmed instructions and dosages as correct. Caller was advised that Meds will be refilled as soon as possible, however there can be a 48-72 business hour turn around on refill requests.

## 2023-11-29 NOTE — TELEPHONE ENCOUNTER
PCP: Baltazar Bunch MD    Last appt: 10/9/2023  Future Appointments   Date Time Provider [address]   10/15/2024 11:00 AM Baltazar Bunch MD [facility] BS AMB       Requested Prescriptions     Pending Prescriptions Disp Refills    atorvastatin (LIPITOR) 40 MG tablet 90 tablet 0     Sig: Take 1 tablet by mouth daily

## 2024-02-21 RX ORDER — ATORVASTATIN CALCIUM 40 MG/1
40 TABLET, FILM COATED ORAL DAILY
Qty: 90 TABLET | Refills: 0 | Status: SHIPPED | OUTPATIENT
Start: 2024-02-21

## 2024-02-21 NOTE — TELEPHONE ENCOUNTER
PCP: Eulalia Sanchez MD    Last appt: 10/9/2023  Future Appointments   Date Time Provider Department Center   10/15/2024 11:00 AM Eulalia Sanchez MD [facility] BS AMB       Requested Prescriptions     Pending Prescriptions Disp Refills    atorvastatin (LIPITOR) 40 MG tablet 90 tablet 0     Sig: Take 1 tablet by mouth daily

## 2024-03-05 ENCOUNTER — OFFICE VISIT (OUTPATIENT)
Age: 79
End: 2024-03-05
Payer: MEDICARE

## 2024-03-05 ENCOUNTER — HOSPITAL ENCOUNTER (OUTPATIENT)
Facility: HOSPITAL | Age: 79
Discharge: HOME | End: 2024-03-08
Payer: MEDICARE

## 2024-03-05 VITALS
HEIGHT: 62 IN | SYSTOLIC BLOOD PRESSURE: 122 MMHG | TEMPERATURE: 97.9 F | BODY MASS INDEX: 22.08 KG/M2 | DIASTOLIC BLOOD PRESSURE: 72 MMHG | HEART RATE: 100 BPM | RESPIRATION RATE: 18 BRPM | WEIGHT: 120 LBS | OXYGEN SATURATION: 95 %

## 2024-03-05 DIAGNOSIS — M19.072 PRIMARY OSTEOARTHRITIS OF LEFT FOOT: ICD-10-CM

## 2024-03-05 DIAGNOSIS — H93.8X1 EAR PRESSURE, RIGHT: ICD-10-CM

## 2024-03-05 DIAGNOSIS — E78.1 PURE HYPERGLYCERIDEMIA: ICD-10-CM

## 2024-03-05 DIAGNOSIS — C50.919 MALIGNANT NEOPLASM OF FEMALE BREAST, UNSPECIFIED ESTROGEN RECEPTOR STATUS, UNSPECIFIED LATERALITY, UNSPECIFIED SITE OF BREAST (HCC): ICD-10-CM

## 2024-03-05 DIAGNOSIS — R73.01 IFG (IMPAIRED FASTING GLUCOSE): Primary | ICD-10-CM

## 2024-03-05 PROCEDURE — 99214 OFFICE O/P EST MOD 30 MIN: CPT | Performed by: INTERNAL MEDICINE

## 2024-03-05 PROCEDURE — 73630 X-RAY EXAM OF FOOT: CPT

## 2024-03-05 PROCEDURE — 73600 X-RAY EXAM OF ANKLE: CPT

## 2024-03-05 PROCEDURE — G8400 PT W/DXA NO RESULTS DOC: HCPCS | Performed by: INTERNAL MEDICINE

## 2024-03-05 PROCEDURE — 1036F TOBACCO NON-USER: CPT | Performed by: INTERNAL MEDICINE

## 2024-03-05 PROCEDURE — G8420 CALC BMI NORM PARAMETERS: HCPCS | Performed by: INTERNAL MEDICINE

## 2024-03-05 PROCEDURE — G8427 DOCREV CUR MEDS BY ELIG CLIN: HCPCS | Performed by: INTERNAL MEDICINE

## 2024-03-05 PROCEDURE — 1123F ACP DISCUSS/DSCN MKR DOCD: CPT | Performed by: INTERNAL MEDICINE

## 2024-03-05 PROCEDURE — 1090F PRES/ABSN URINE INCON ASSESS: CPT | Performed by: INTERNAL MEDICINE

## 2024-03-05 PROCEDURE — G8484 FLU IMMUNIZE NO ADMIN: HCPCS | Performed by: INTERNAL MEDICINE

## 2024-03-05 RX ORDER — DICLOFENAC SODIUM 75 MG/1
75 TABLET, DELAYED RELEASE ORAL 2 TIMES DAILY PRN
Qty: 60 TABLET | Refills: 0 | Status: SHIPPED | OUTPATIENT
Start: 2024-03-05

## 2024-03-05 ASSESSMENT — PATIENT HEALTH QUESTIONNAIRE - PHQ9
SUM OF ALL RESPONSES TO PHQ QUESTIONS 1-9: 0
1. LITTLE INTEREST OR PLEASURE IN DOING THINGS: 0
2. FEELING DOWN, DEPRESSED OR HOPELESS: 0
SUM OF ALL RESPONSES TO PHQ QUESTIONS 1-9: 0
SUM OF ALL RESPONSES TO PHQ9 QUESTIONS 1 & 2: 0

## 2024-03-05 ASSESSMENT — ENCOUNTER SYMPTOMS: SHORTNESS OF BREATH: 0

## 2024-03-05 NOTE — PROGRESS NOTES
\"Have you been to the ER, urgent care clinic since your last visit?  Hospitalized since your last visit?\"    NO    “Have you seen or consulted any other health care providers outside of [facility] since your last visit?”    NO           
Hemoglobin A1C    [facility] Lexi Gómez MD, Orthopedic Surgery (foot, ankle), [place]   Check plain film of foot and ankle will give diclofenac for as needed use if not improving will have her see Dr. Pritchard Ortho foot XR FOOT LEFT (MIN 3 VIEWS)    XR ANKLE LEFT (2 VIEWS)      4. Pure hyperglyceridemia     Controlled Lipitor   5. Ear pressure, right     No sign of infection we will have her take Flonase as needed    I have reviewed the note documented by the scribe.  The services provided are my own.  The documentation is accurate     Depression screen reviewed and negative.  Scribed by Dariel Vázquez of [facility], as dictated by Dr. Eulalia Sanchez.    Current diagnosis and concerns discussed with pt at length. Pt understands risks and benefits or current treatment plan and medications, and accepts the treatment and medication with any possible risks. Pt asks appropriate questions, which were answered. Pt was instructed to call with any concerns or problems.    I have reviewed the note documented by the scribe. The services provided are my own. The documentation is accurate.

## 2024-03-06 LAB
ANION GAP SERPL CALC-SCNC: 5 MMOL/L (ref 5–15)
BUN SERPL-MCNC: 20 MG/DL (ref 6–20)
BUN/CREAT SERPL: 26 (ref 12–20)
CALCIUM SERPL-MCNC: 9.6 MG/DL (ref 8.5–10.1)
CHLORIDE SERPL-SCNC: 108 MMOL/L (ref 97–108)
CO2 SERPL-SCNC: 28 MMOL/L (ref 21–32)
CREAT SERPL-MCNC: 0.77 MG/DL (ref 0.55–1.02)
EST. AVERAGE GLUCOSE BLD GHB EST-MCNC: 108 MG/DL
GLUCOSE SERPL-MCNC: 108 MG/DL (ref 65–100)
HBA1C MFR BLD: 5.4 % (ref 4–5.6)
POTASSIUM SERPL-SCNC: 3.7 MMOL/L (ref 3.5–5.1)
SODIUM SERPL-SCNC: 141 MMOL/L (ref 136–145)

## 2024-05-30 RX ORDER — ATORVASTATIN CALCIUM 40 MG/1
40 TABLET, FILM COATED ORAL DAILY
Qty: 90 TABLET | Refills: 1 | Status: SHIPPED | OUTPATIENT
Start: 2024-05-30

## 2024-05-30 NOTE — TELEPHONE ENCOUNTER
Future Appointments:  Future Appointments   Date Time Provider Department Center   10/15/2024 11:00 AM Eulalia Sanchez MD [facility] BS AMB        Last Appointment With Me:  3/5/2024     Requested Prescriptions     Pending Prescriptions Disp Refills    atorvastatin (LIPITOR) 40 MG tablet 90 tablet 1     Sig: Take 1 tablet by mouth daily

## 2024-05-30 NOTE — TELEPHONE ENCOUNTER
Caller requests Refill of:  atorvastatin (LIPITOR) 40 MG tablet      Please send to:    [facility] - [place], [place] - [address] [place] [address] - P [phone] - F [phone]  [address] [place] [address]  Phone: [phone] Fax: [phone]         Visit / Appointment History:  Future Appointment at [facility]:  10/15/2024       Last Appointment With PCP:  3/5/2024       Caller confirmed instructions and dosages as correct.    Caller was advised that Meds will be refilled as soon as possible, however there can be a 48-72 business hour turn around on refill requests.

## 2024-09-19 RX ORDER — NYSTATIN 100000 [USP'U]/G
POWDER TOPICAL 4 TIMES DAILY
Qty: 60 G | Refills: 0 | Status: SHIPPED | OUTPATIENT
Start: 2024-09-19

## 2024-09-30 ASSESSMENT — ENCOUNTER SYMPTOMS: SHORTNESS OF BREATH: 0

## 2024-10-01 ENCOUNTER — OFFICE VISIT (OUTPATIENT)
Age: 79
End: 2024-10-01
Payer: MEDICARE

## 2024-10-01 ENCOUNTER — TELEPHONE (OUTPATIENT)
Age: 79
End: 2024-10-01

## 2024-10-01 VITALS
WEIGHT: 123 LBS | HEART RATE: 67 BPM | DIASTOLIC BLOOD PRESSURE: 70 MMHG | BODY MASS INDEX: 22.63 KG/M2 | RESPIRATION RATE: 18 BRPM | OXYGEN SATURATION: 97 % | TEMPERATURE: 97.2 F | HEIGHT: 62 IN | SYSTOLIC BLOOD PRESSURE: 123 MMHG

## 2024-10-01 DIAGNOSIS — Z85.3 H/O MALIGNANT NEOPLASM OF BREAST: ICD-10-CM

## 2024-10-01 DIAGNOSIS — R73.01 IFG (IMPAIRED FASTING GLUCOSE): Primary | ICD-10-CM

## 2024-10-01 DIAGNOSIS — B37.9 CANDIDA INFECTION: ICD-10-CM

## 2024-10-01 DIAGNOSIS — Z00.00 MEDICARE ANNUAL WELLNESS VISIT, SUBSEQUENT: ICD-10-CM

## 2024-10-01 DIAGNOSIS — Z23 NEEDS FLU SHOT: ICD-10-CM

## 2024-10-01 DIAGNOSIS — M19.072 PRIMARY OSTEOARTHRITIS OF LEFT FOOT: ICD-10-CM

## 2024-10-01 DIAGNOSIS — E78.1 PURE HYPERGLYCERIDEMIA: ICD-10-CM

## 2024-10-01 PROCEDURE — 99214 OFFICE O/P EST MOD 30 MIN: CPT | Performed by: INTERNAL MEDICINE

## 2024-10-01 PROCEDURE — 90653 IIV ADJUVANT VACCINE IM: CPT | Performed by: INTERNAL MEDICINE

## 2024-10-01 RX ORDER — TRIAMCINOLONE ACETONIDE 1 MG/G
CREAM TOPICAL
Qty: 15 G | Refills: 0 | Status: SHIPPED | OUTPATIENT
Start: 2024-10-01

## 2024-10-01 RX ORDER — KETOCONAZOLE 20 MG/G
CREAM TOPICAL DAILY
Qty: 60 G | Refills: 0 | Status: SHIPPED | OUTPATIENT
Start: 2024-10-01

## 2024-10-01 SDOH — ECONOMIC STABILITY: FOOD INSECURITY: WITHIN THE PAST 12 MONTHS, THE FOOD YOU BOUGHT JUST DIDN'T LAST AND YOU DIDN'T HAVE MONEY TO GET MORE.: NEVER TRUE

## 2024-10-01 SDOH — ECONOMIC STABILITY: FOOD INSECURITY: WITHIN THE PAST 12 MONTHS, YOU WORRIED THAT YOUR FOOD WOULD RUN OUT BEFORE YOU GOT MONEY TO BUY MORE.: NEVER TRUE

## 2024-10-01 SDOH — ECONOMIC STABILITY: INCOME INSECURITY: HOW HARD IS IT FOR YOU TO PAY FOR THE VERY BASICS LIKE FOOD, HOUSING, MEDICAL CARE, AND HEATING?: NOT HARD AT ALL

## 2024-10-01 ASSESSMENT — PATIENT HEALTH QUESTIONNAIRE - PHQ9
SUM OF ALL RESPONSES TO PHQ QUESTIONS 1-9: 0
1. LITTLE INTEREST OR PLEASURE IN DOING THINGS: NOT AT ALL
SUM OF ALL RESPONSES TO PHQ QUESTIONS 1-9: 0
2. FEELING DOWN, DEPRESSED OR HOPELESS: NOT AT ALL
SUM OF ALL RESPONSES TO PHQ QUESTIONS 1-9: 0
SUM OF ALL RESPONSES TO PHQ QUESTIONS 1-9: 0
SUM OF ALL RESPONSES TO PHQ9 QUESTIONS 1 & 2: 0

## 2024-10-01 ASSESSMENT — LIFESTYLE VARIABLES
HOW OFTEN DO YOU HAVE A DRINK CONTAINING ALCOHOL: NEVER
HOW MANY STANDARD DRINKS CONTAINING ALCOHOL DO YOU HAVE ON A TYPICAL DAY: PATIENT DOES NOT DRINK

## 2024-10-01 NOTE — TELEPHONE ENCOUNTER
Pt states that she should have been given a slip for labs.  She did not get.  Please put in system so she can come back in.    Please call when done.

## 2024-10-01 NOTE — TELEPHONE ENCOUNTER
Called, Spoke with Pt  Received two pt identifiers  Apologized to pt for forgetting about ordering the blood work  Pt informed per Dr. Sanchez labs ordered and to come back in for fasting labs M-F 8am-4pm

## 2024-10-01 NOTE — PROGRESS NOTES
Medicare Annual Wellness Visit    Leilani Caicedo is here for Breast Problem (Skin issue under breast x 1 month) and Medicare AWV    Assessment & Plan   IFG (impaired fasting glucose)  Medicare annual wellness visit, subsequent  H/O malignant neoplasm of breast  Candida infection  Pure hyperglyceridemia  Primary osteoarthritis of left foot  Needs flu shot  -     Influenza, FLUAD Trivalent, (age 65 y+), IM, Preservative Free, 0.5mL  Recommendations for Preventive Services Due: see orders and patient instructions/AVS.  Recommended screening schedule for the next 5-10 years is provided to the patient in written form: see Patient Instructions/AVS.     Return in about 1 year (around 10/1/2025).     Subjective       Patient's complete Health Risk Assessment and screening values have been reviewed and are found in Flowsheets. The following problems were reviewed today and where indicated follow up appointments were made and/or referrals ordered.    Positive Risk Factor Screenings with Interventions:    Fall Risk:  Do you feel unsteady or are you worried about falling? : (!) yes  2 or more falls in past year?: no  Fall with injury in past year?: no     Interventions:    Discussed no interest in PT                                    Objective   Vitals:    10/01/24 1128   BP: 123/70   Site: Left Upper Arm   Position: Sitting   Pulse: 67   Resp: 18   Temp: 97.2 °F (36.2 °C)   TempSrc: Temporal   SpO2: 97%   Weight: 55.8 kg (123 lb)   Height: 1.575 m (5' 2\")      Body mass index is 22.5 kg/m².                  No Known Allergies  Prior to Visit Medications    Medication Sig Taking? Authorizing Provider   ketoconazole (NIZORAL) 2 % cream Apply topically daily Yes Eulalia Sanchez MD   triamcinolone (KENALOG) 0.1 % cream Apply topically each evening for 1 week Yes Eulalia Sanchez MD   nystatin (MYCOSTATIN) 939808 UNIT/GM powder Apply topically 4 times daily Yes Eulalia Sanchez MD   atorvastatin (LIPITOR) 40 MG tablet Take 1 
\"Have you been to the ER, urgent care clinic since your last visit?  Hospitalized since your last visit?\"    NO    “Have you seen or consulted any other health care providers outside our system since your last visit?”    NO           
in the past.         PREVENTIVE:  AAA: no fmhx, not needed  Colonoscopy: , Dr. Doyle 1 polyp, 5 yr repeat  Pap: Dr. Lissette Sanchez, , hysterectomy in 1970s, declines further  Mammogram: 10/23, neg, scheduled 10/17/24  Dexa: declines   Tdap: declines  Pneumovax: 16  Lvilskc89: 3/16/17  Zostavax: 10/26/12  Shingrix: 1st round completed , 2nd round completed   Flu shot: will do 10/1/24  RSV: discussed  Hep C Screen: 3/17, negative  A1C:   5.6 10/23 5.7 3/24 5.4   Eye exam: Dr. Cochran Shows   Lipids: 10/23 LDL 79  EK17, nsr  Covid: four doses (Pfizer), discussed getting booster at pharmacy    Patient Active Problem List   Diagnosis    Prediabetes    Personal history of colonic polyps    Pure hyperglyceridemia    Primary osteoarthritis of left foot    H/O malignant neoplasm of breast     Current Outpatient Medications   Medication Sig Dispense Refill    ketoconazole (NIZORAL) 2 % cream Apply topically daily 60 g 0    triamcinolone (KENALOG) 0.1 % cream Apply topically each evening for 1 week 15 g 0    nystatin (MYCOSTATIN) 307528 UNIT/GM powder Apply topically 4 times daily 60 g 0    atorvastatin (LIPITOR) 40 MG tablet Take 1 tablet by mouth daily 90 tablet 1    cetirizine (ZYRTEC) 10 MG tablet ceived the following from [facility]: Outside name: cetirizine (ZYRTEC) 10 mg tablet       No current facility-administered medications for this visit.     Past Surgical History:   Procedure Laterality Date    BREAST BIOPSY Right     BREAST LUMPECTOMY Right 2006    no radiation or chemo    BREAST SURGERY  2009    right lumpectomy    COLONOSCOPY N/A 5/3/2023    COLONOSCOPY performed by Hugo Doyle MD at [facility] ENDOSCOPY    COLONOSCOPY      GYN      hysterectomy 72's oopherectomy 2017    PRE-MALIGNANT / BENIGN SKIN LESION EXCISION      Excison soft tissue mass of back- Barrett Peters MD    UROLOGICAL SURGERY  10/19/2017    bladder prolapse         Lab Results

## 2024-10-02 ENCOUNTER — LAB (OUTPATIENT)
Age: 79
End: 2024-10-02

## 2024-10-02 DIAGNOSIS — R73.01 IFG (IMPAIRED FASTING GLUCOSE): ICD-10-CM

## 2024-10-02 DIAGNOSIS — Z85.3 H/O MALIGNANT NEOPLASM OF BREAST: ICD-10-CM

## 2024-10-02 DIAGNOSIS — E78.1 PURE HYPERGLYCERIDEMIA: ICD-10-CM

## 2024-10-04 LAB
ALBUMIN SERPL-MCNC: 4.2 G/DL (ref 3.5–5)
ALBUMIN/GLOB SERPL: 1.4 (ref 1.1–2.2)
ALP SERPL-CCNC: 86 U/L (ref 45–117)
ALT SERPL-CCNC: 27 U/L (ref 12–78)
ANION GAP SERPL CALC-SCNC: 3 MMOL/L (ref 2–12)
AST SERPL-CCNC: 19 U/L (ref 15–37)
BILIRUB SERPL-MCNC: 0.9 MG/DL (ref 0.2–1)
BUN SERPL-MCNC: 14 MG/DL (ref 6–20)
BUN/CREAT SERPL: 19 (ref 12–20)
CALCIUM SERPL-MCNC: 9.4 MG/DL (ref 8.5–10.1)
CHLORIDE SERPL-SCNC: 106 MMOL/L (ref 97–108)
CHOLEST SERPL-MCNC: 149 MG/DL
CO2 SERPL-SCNC: 31 MMOL/L (ref 21–32)
CREAT SERPL-MCNC: 0.75 MG/DL (ref 0.55–1.02)
ERYTHROCYTE [DISTWIDTH] IN BLOOD BY AUTOMATED COUNT: 12.3 % (ref 11.5–14.5)
EST. AVERAGE GLUCOSE BLD GHB EST-MCNC: 120 MG/DL
GLOBULIN SER CALC-MCNC: 2.9 G/DL (ref 2–4)
GLUCOSE SERPL-MCNC: 89 MG/DL (ref 65–100)
HBA1C MFR BLD: 5.8 % (ref 4–5.6)
HCT VFR BLD AUTO: 41.2 % (ref 35–47)
HDLC SERPL-MCNC: 69 MG/DL
HDLC SERPL: 2.2 (ref 0–5)
HGB BLD-MCNC: 13.8 G/DL (ref 11.5–16)
LDLC SERPL CALC-MCNC: 56.2 MG/DL (ref 0–100)
MCH RBC QN AUTO: 31.2 PG (ref 26–34)
MCHC RBC AUTO-ENTMCNC: 33.5 G/DL (ref 30–36.5)
MCV RBC AUTO: 93.2 FL (ref 80–99)
NRBC # BLD: 0 K/UL (ref 0–0.01)
NRBC BLD-RTO: 0 PER 100 WBC
PLATELET # BLD AUTO: 153 K/UL (ref 150–400)
PMV BLD AUTO: 11.4 FL (ref 8.9–12.9)
POTASSIUM SERPL-SCNC: 4.4 MMOL/L (ref 3.5–5.1)
PROT SERPL-MCNC: 7.1 G/DL (ref 6.4–8.2)
RBC # BLD AUTO: 4.42 M/UL (ref 3.8–5.2)
SODIUM SERPL-SCNC: 140 MMOL/L (ref 136–145)
TRIGL SERPL-MCNC: 119 MG/DL
TSH SERPL DL<=0.05 MIU/L-ACNC: 1.33 UIU/ML (ref 0.36–3.74)
VLDLC SERPL CALC-MCNC: 23.8 MG/DL
WBC # BLD AUTO: 4.1 K/UL (ref 3.6–11)

## 2024-10-17 ENCOUNTER — HOSPITAL ENCOUNTER (OUTPATIENT)
Facility: HOSPITAL | Age: 79
Discharge: HOME | End: 2024-10-20
Attending: INTERNAL MEDICINE
Payer: MEDICARE

## 2024-10-17 VITALS — HEIGHT: 62 IN | BODY MASS INDEX: 22.63 KG/M2 | WEIGHT: 123 LBS

## 2024-10-17 DIAGNOSIS — Z12.31 ENCOUNTER FOR SCREENING MAMMOGRAM FOR HIGH-RISK PATIENT: ICD-10-CM

## 2024-10-17 PROCEDURE — 77067 SCR MAMMO BI INCL CAD: CPT

## 2024-10-18 ENCOUNTER — TELEPHONE (OUTPATIENT)
Age: 79
End: 2024-10-18

## 2024-11-18 RX ORDER — ATORVASTATIN CALCIUM 40 MG/1
40 TABLET, FILM COATED ORAL DAILY
Qty: 90 TABLET | Refills: 2 | Status: SHIPPED | OUTPATIENT
Start: 2024-11-18

## 2024-11-25 ASSESSMENT — ENCOUNTER SYMPTOMS: SHORTNESS OF BREATH: 0

## 2024-11-25 NOTE — PROGRESS NOTES
HISTORY OF PRESENT ILLNESS  Leilani Caicedo is a 79 y.o. female.  HPI    Last here 10/1/24. Presents for acute care.     She states that her nails have started peeling and she has a white spot on 2-3 of her fingernails after taking nail polish off. She has had this x 2 weeks, had nail polish on her nails x 2 weeks up until that point, noticed it when she took the nail polish off. She did not have this done at a nail salon, was her own polish. She has one toenail that is similar on her R foot as well that also looks as though it has a fungal component. The fingernails do not look fungal.     She does not normally follow with a dermatologist   She states the candida rash underneath her breasts has resolved, she has been using Desitin cream     Covid and RSV were 10/24  Flu was 10/1/24  Reviewed mammogram 10/17/24 - neg    Patient Active Problem List   Diagnosis    Prediabetes    History of colonic polyps    Pure hyperglyceridemia    Primary osteoarthritis of left foot    H/O malignant neoplasm of breast     Current Outpatient Medications   Medication Sig Dispense Refill    atorvastatin (LIPITOR) 40 MG tablet TAKE 1 TABLET DAILY 90 tablet 2    cetirizine (ZYRTEC) 10 MG tablet ceived the following from [facility]: Outside name: cetirizine (ZYRTEC) 10 mg tablet      ketoconazole (NIZORAL) 2 % cream Apply topically daily (Patient not taking: Reported on 11/26/2024) 60 g 0    triamcinolone (KENALOG) 0.1 % cream Apply topically each evening for 1 week 15 g 0    nystatin (MYCOSTATIN) 756885 UNIT/GM powder Apply topically 4 times daily 60 g 0     No current facility-administered medications for this visit.     Past Surgical History:   Procedure Laterality Date    BREAST BIOPSY Right 1031/2009    BREAST LUMPECTOMY Right 11/16/2006    benign mass    BREAST SURGERY  2009    right lumpectomy    COLONOSCOPY N/A 5/3/2023    COLONOSCOPY performed by Hugo Doyle MD at [facility] ENDOSCOPY    COLONOSCOPY      GYN

## 2024-11-26 ENCOUNTER — OFFICE VISIT (OUTPATIENT)
Age: 79
End: 2024-11-26
Payer: MEDICARE

## 2024-11-26 VITALS
RESPIRATION RATE: 15 BRPM | DIASTOLIC BLOOD PRESSURE: 75 MMHG | TEMPERATURE: 98.1 F | BODY MASS INDEX: 22.45 KG/M2 | WEIGHT: 122 LBS | HEART RATE: 90 BPM | SYSTOLIC BLOOD PRESSURE: 129 MMHG | OXYGEN SATURATION: 95 % | HEIGHT: 62 IN

## 2024-11-26 DIAGNOSIS — L60.8 CHANGE IN NAIL APPEARANCE: Primary | ICD-10-CM

## 2024-11-26 DIAGNOSIS — B37.9 CANDIDA INFECTION: ICD-10-CM

## 2024-11-26 PROCEDURE — 1036F TOBACCO NON-USER: CPT | Performed by: INTERNAL MEDICINE

## 2024-11-26 PROCEDURE — 99213 OFFICE O/P EST LOW 20 MIN: CPT | Performed by: INTERNAL MEDICINE

## 2024-11-26 PROCEDURE — 1159F MED LIST DOCD IN RCRD: CPT | Performed by: INTERNAL MEDICINE

## 2024-11-26 PROCEDURE — G8400 PT W/DXA NO RESULTS DOC: HCPCS | Performed by: INTERNAL MEDICINE

## 2024-11-26 PROCEDURE — 1160F RVW MEDS BY RX/DR IN RCRD: CPT | Performed by: INTERNAL MEDICINE

## 2024-11-26 PROCEDURE — 1126F AMNT PAIN NOTED NONE PRSNT: CPT | Performed by: INTERNAL MEDICINE

## 2024-11-26 PROCEDURE — G8420 CALC BMI NORM PARAMETERS: HCPCS | Performed by: INTERNAL MEDICINE

## 2024-11-26 PROCEDURE — G8427 DOCREV CUR MEDS BY ELIG CLIN: HCPCS | Performed by: INTERNAL MEDICINE

## 2024-11-26 PROCEDURE — 1123F ACP DISCUSS/DSCN MKR DOCD: CPT | Performed by: INTERNAL MEDICINE

## 2024-11-26 PROCEDURE — 1090F PRES/ABSN URINE INCON ASSESS: CPT | Performed by: INTERNAL MEDICINE

## 2024-11-26 PROCEDURE — G8482 FLU IMMUNIZE ORDER/ADMIN: HCPCS | Performed by: INTERNAL MEDICINE

## 2024-11-26 ASSESSMENT — PATIENT HEALTH QUESTIONNAIRE - PHQ9
SUM OF ALL RESPONSES TO PHQ QUESTIONS 1-9: 0
SUM OF ALL RESPONSES TO PHQ9 QUESTIONS 1 & 2: 0
SUM OF ALL RESPONSES TO PHQ QUESTIONS 1-9: 0
1. LITTLE INTEREST OR PLEASURE IN DOING THINGS: NOT AT ALL
SUM OF ALL RESPONSES TO PHQ QUESTIONS 1-9: 0
SUM OF ALL RESPONSES TO PHQ QUESTIONS 1-9: 0
2. FEELING DOWN, DEPRESSED OR HOPELESS: NOT AT ALL

## 2025-08-15 RX ORDER — ATORVASTATIN CALCIUM 40 MG/1
40 TABLET, FILM COATED ORAL DAILY
Qty: 90 TABLET | Refills: 3 | Status: SHIPPED | OUTPATIENT
Start: 2025-08-15

## 2025-08-21 ENCOUNTER — TRANSCRIBE ORDERS (OUTPATIENT)
Facility: HOSPITAL | Age: 80
End: 2025-08-21

## 2025-08-21 DIAGNOSIS — M25.462 EFFUSION OF LEFT KNEE: Primary | ICD-10-CM

## 2025-08-26 ENCOUNTER — HOSPITAL ENCOUNTER (OUTPATIENT)
Facility: HOSPITAL | Age: 80
Discharge: HOME | End: 2025-08-29
Attending: ORTHOPAEDIC SURGERY
Payer: MEDICARE

## 2025-08-26 DIAGNOSIS — M25.462 EFFUSION OF LEFT KNEE: ICD-10-CM

## 2025-08-26 PROCEDURE — 73721 MRI JNT OF LWR EXTRE W/O DYE: CPT

## (undated) DEVICE — 1200 GUARD II KIT W/5MM TUBE W/O VAC TUBE: Brand: GUARDIAN

## (undated) DEVICE — CHEST/BREAST-LF: Brand: MEDLINE INDUSTRIES, INC.

## (undated) DEVICE — TRAP,MUCUS SPECIMEN, 80CC: Brand: MEDLINE

## (undated) DEVICE — (D)PREP SKN CHLRAPRP APPL 26ML -- CONVERT TO ITEM 371833

## (undated) DEVICE — STRAINER URIN CALC RNL MSH -- CONVERT TO ITEM 357634

## (undated) DEVICE — CATHETER IV 20GA L1.25IN FEP STR HUB TEF INTROCAN SFTY

## (undated) DEVICE — STERILE POLYISOPRENE POWDER-FREE SURGICAL GLOVES: Brand: PROTEXIS

## (undated) DEVICE — SYR 3ML LL TIP 1/10ML GRAD --

## (undated) DEVICE — NEONATAL-ADULT SPO2 SENSOR: Brand: NELLCOR

## (undated) DEVICE — HYPODERMIC SAFETY NEEDLE: Brand: MAGELLAN

## (undated) DEVICE — INFECTION CONTROL KIT SYS

## (undated) DEVICE — APPLICATOR BNDG 1MM ADH PREMIERPRO EXOFIN

## (undated) DEVICE — HEX-LOCKING BLADE ELECTRODE: Brand: EDGE

## (undated) DEVICE — Device

## (undated) DEVICE — LIGHT HANDLE: Brand: DEVON

## (undated) DEVICE — BASIN EMSIS 16OZ GRAPHITE PLAS KID SHP MOLD GRAD FOR ORAL

## (undated) DEVICE — REM POLYHESIVE ADULT PATIENT RETURN ELECTRODE: Brand: VALLEYLAB

## (undated) DEVICE — SYR 10ML LUER LOK 1/5ML GRAD --

## (undated) DEVICE — SOLUTION LACTATED RINGERS INJECTION USP

## (undated) DEVICE — TOWEL 4 PLY TISS 19X30 SUE WHT

## (undated) DEVICE — 3M™ TEGADERM™ TRANSPARENT FILM DRESSING FRAME STYLE, 1624W, 2-3/8 IN X 2-3/4 IN (6 CM X 7 CM), 100/CT 4CT/CASE: Brand: 3M™ TEGADERM™

## (undated) DEVICE — CONTINU-FLO SOLUTION SET, 2 INJECTION SITES, MALE LUER LOCK ADAPTER WITH RETRACTABLE COLLAR, LARGE BORE STOPCOCK WITH ROTATING MALE LUER LOCK EXTENSION SET, 2 INJECTION SITES, MALE LUER LOCK ADAPTER WITH RETRACTABLE COLLAR: Brand: INTERLINK/CONTINU-FLO

## (undated) DEVICE — SNARE ENDOSCP M L240CM W27MM SHTH DIA2.4MM CHN 2.8MM OVL

## (undated) DEVICE — SUTURE MCRYL SZ 4-0 L27IN ABSRB UD L19MM PS-2 1/2 CIR PRIM Y426H

## (undated) DEVICE — SOLUTION IV 1000ML 0.9% SOD CHL

## (undated) DEVICE — KENDALL DL ECG CABLE AND LEAD WIRE SYSTEM, 3-LEAD, SINGLE PATIENT USE: Brand: KENDALL

## (undated) DEVICE — CATH IV AUTOGRD BC PNK 20GA 25 -- INSYTE

## (undated) DEVICE — ROCKER SWITCH PENCIL BLADE ELECTRODE, HOLSTER: Brand: EDGE

## (undated) DEVICE — ELECTRODE,RADIOTRANSLUCENT,FOAM,5PK: Brand: MEDLINE

## (undated) DEVICE — SET ADMIN 16ML TBNG L100IN 2 Y INJ SITE IV PIGGY BK DISP (ORDER IN MULIPLES OF 48)

## (undated) DEVICE — Z DISCONTINUED PER MEDLINE LINE GAS SAMPLING O2/CO2 LNG AD 13 FT NSL W/ TBNG FILTERLINE

## (undated) DEVICE — SUTURE ABSORBABLE BRAIDED 3-0 SHB 18 IN UD VICRYL + VCPB864D

## (undated) DEVICE — IODOFORM PACKING STRIP: Brand: CURITY

## (undated) DEVICE — NEEDLE HYPO 22GA L1.5IN BLK S STL HUB POLYPR SHLD REG BVL

## (undated) DEVICE — CONTAINER SPEC 20 ML LID NEUT BUFF FORMALIN 10 % POLYPR STS